# Patient Record
Sex: MALE | Race: WHITE | NOT HISPANIC OR LATINO | Employment: FULL TIME | URBAN - METROPOLITAN AREA
[De-identification: names, ages, dates, MRNs, and addresses within clinical notes are randomized per-mention and may not be internally consistent; named-entity substitution may affect disease eponyms.]

---

## 2017-01-18 ENCOUNTER — TELEPHONE (OUTPATIENT)
Dept: FAMILY MEDICINE | Facility: CLINIC | Age: 37
End: 2017-01-18

## 2017-02-03 ENCOUNTER — OFFICE VISIT (OUTPATIENT)
Dept: URGENT CARE | Facility: RETAIL CLINIC | Age: 37
End: 2017-02-03
Payer: COMMERCIAL

## 2017-02-03 VITALS
OXYGEN SATURATION: 95 % | RESPIRATION RATE: 12 BRPM | HEART RATE: 100 BPM | BODY MASS INDEX: 36.79 KG/M2 | SYSTOLIC BLOOD PRESSURE: 144 MMHG | WEIGHT: 278.8 LBS | DIASTOLIC BLOOD PRESSURE: 91 MMHG | TEMPERATURE: 97.8 F

## 2017-02-03 DIAGNOSIS — H10.33 ACUTE CONJUNCTIVITIS OF BOTH EYES, UNSPECIFIED ACUTE CONJUNCTIVITIS TYPE: ICD-10-CM

## 2017-02-03 DIAGNOSIS — H57.89 REDNESS OR DISCHARGE OF EYE: Primary | ICD-10-CM

## 2017-02-03 PROCEDURE — 99213 OFFICE O/P EST LOW 20 MIN: CPT | Performed by: NURSE PRACTITIONER

## 2017-02-03 ASSESSMENT — PAIN SCALES - GENERAL: PAINLEVEL: NO PAIN (0)

## 2017-02-03 NOTE — NURSING NOTE
"Chief Complaint   Patient presents with     Eye Problem     left eye. started this morning. discharge, minor itching       Initial /91 mmHg  Pulse 100  Temp(Src) 97.8  F (36.6  C) (Tympanic)  Resp 12  Wt 278 lb 12.8 oz (126.463 kg)  SpO2 95% Estimated body mass index is 36.79 kg/(m^2) as calculated from the following:    Height as of 2/4/16: 6' 1\" (1.854 m).    Weight as of this encounter: 278 lb 12.8 oz (126.463 kg).  BP completed using cuff size: jena Holbrook CMA (AAMA)      "

## 2017-02-03 NOTE — PROGRESS NOTES
SUBJECTIVE:  Howie Yang is a 37 year old male who presents complaining of mild left eye discharge, mattering, redness for 1 day(s).   Onset/timing: sudden.    Associated Signs and Symptoms: weepy & funny feeling in eye (was exposed to Pink Eye), a little blurry, been fine since Thanksgiving   Treatment measures tried include: none  Contact wearer : No    Past Medical History   Diagnosis Date     NO ACTIVE PROBLEMS      Current Outpatient Prescriptions   Medication Sig Dispense Refill     neomycin-polymixin-dexamethasone (MAXITROL) ophthalmic ointment Instil 1/2 inch ribbon of ointment into affected eyes every 4 hours for 7 to 10 days. 3.5 g 0     IBUPROFEN PO Take 600 mg by mouth       History   Smoking status     Former Smoker   Smokeless tobacco     Former User     Types: Snuff       ROS:  Review of systems negative except as stated above.    OBJECTIVE:  /91 mmHg  Pulse 100  Temp(Src) 97.8  F (36.6  C) (Tympanic)  Resp 12  Wt 278 lb 12.8 oz (126.463 kg)  SpO2 95%  General: no acute distress  Eye exam: right eye abnormal findings: conjunctivitis with erythema, left eye abnormal findings: conjunctivitis with erythema, possible scratch.  Ears: normal canals, TMs bilaterally, normal TM mobility  Nose: NORMAL - no drainage, turbinates normal in size.  Neck: supple, non-tender, free range of motion, no adenopathy  Heart: NORMAL - regular rate and rhythm without murmur.  Lungs: normal and clear to auscultation    ASSESSMENT:  Redness or discharge of eye [H57.8]  Acute conjunctivitis of both eyes, unspecified acute conjunctivitis type [H10.33]    PLAN:  neomycin-polymixin-dexamethasone (MAXITROL) ophthalmic ointment  Before administering antibiotic, remove all the pus from the eye with warm water & a wipe.  The yellowish discharge should clear up in 72 hours. The red eyes may continue for several more days.  Patient is instructed on hygiene for conjunctivitis: discard her own kleenex, avoid rubbing  unaffected eye(rubbing eyes can make the infection last longer), wash hands frequently, change linens which become contaminated.  Touching eyes also puts a lot of germs on hands & can spread the infection.  After using eye drops for 24 hours, and if the pus is minimal, children can return to day care or school as they should no longer be Contagious.  If treated with an oral antibiotic the wait time to return to  is 48 hours.  Be seen by PCP or even go to the ED if outer eyelids become very red or swollen, eye becomes painful or vision becomes blurred.    F/U with PCP if infection isn't cleared up after 3 days of treatment or an earache develops.    Alin WILBURN, MSN, Family NP-C  [facility]  February 3, 2017

## 2017-02-03 NOTE — MR AVS SNAPSHOT
"              After Visit Summary   2/3/2017    Howie Yang    MRN: [id number]           Patient Information     Date Of Birth          1980        Visit Information        Provider Department      2/3/2017 2:40 PM Alin Gonzales APRN [facility]        Today's Diagnoses     Redness or discharge of eye    -  1     Acute conjunctivitis of both eyes, unspecified acute conjunctivitis type            Follow-ups after your visit        Who to contact     You can reach your care team any time of the day by calling [phone].  Notification of test results:  If you have an abnormal lab result, we will notify you by phone as soon as possible.         Additional Information About Your Visit        MyChart Information     [employer] lets you send messages to your doctor, view your test results, renew your prescriptions, schedule appointments and more. To sign up, go to [email or web address] . Click on \"Log in\" on the left side of the screen, which will take you to the Welcome page. Then click on \"Sign up Now\" on the right side of the page.     You will be asked to enter the access code listed below, as well as some personal information. Please follow the directions to create your username and password.     Your access code is: [email or web address]  Expires: 2017  2:55 PM     Your access code will  in 90 days. If you need help or a new code, please call your [facility] clinic or [phone].        Care EveryWhere ID     This is your [facility] EveryWhere ID. This could be used by other organizations to access your [facility] medical records  [id number]        Your Vitals Were     Pulse Temperature Respirations Pulse Oximetry          100 97.8  F (36.6  C) (Tympanic) 12 95%         Blood Pressure from Last 3 Encounters:   17 144/91   16 126/78   01/10/16 148/92    Weight from Last 3 Encounters:   17 278 lb 12.8 oz (126.463 kg)   16 265 lb (120.203 kg)   01/10/16 250 lb " (113.399 kg)              Today, you had the following     No orders found for display         Today's Medication Changes          These changes are accurate as of: 2/3/17  2:55 PM.  If you have any questions, ask your nurse or doctor.               Start taking these medicines.        Dose/Directions    neomycin-polymixin-dexamethasone ophthalmic ointment   Commonly known as:  MAXITROL   Used for:  Acute conjunctivitis of both eyes, unspecified acute conjunctivitis type, Redness or discharge of eye   Started by:  Alin Gonzales, AKILA CNP        Instil 1/2 inch ribbon of ointment into affected eyes every 4 hours for 7 to 10 days.   Quantity:  3.5 g   Refills:  0            Where to get your medicines      These medications were sent to [facility]  [address] [facility] [address]     Phone:  [phone]    - neomycin-polymixin-dexamethasone ophthalmic ointment             Primary Care Provider Office Phone #    [facility] [phone]       No address on file        Thank you!     Thank you for choosing [facility]  for your care. Our goal is always to provide you with excellent care. Hearing back from our patients is one way we can continue to improve our services. Please take a few minutes to complete the written survey that you may receive in the mail after your visit with us. Thank you!             Your Updated Medication List - Protect others around you: Learn how to safely use, store and throw away your medicines at www.disposemymeds.org.          This list is accurate as of: 2/3/17  2:55 PM.  Always use your most recent med list.                   Brand Name Dispense Instructions for use    IBUPROFEN PO      Take 600 mg by mouth       neomycin-polymixin-dexamethasone ophthalmic ointment    MAXITROL    3.5 g    Instil 1/2 inch ribbon of ointment into affected eyes every 4 hours for 7 to 10 days.

## 2017-06-09 PROCEDURE — 89321 SEMEN ANAL SPERM DETECTION: CPT | Performed by: FAMILY MEDICINE

## 2017-06-10 DIAGNOSIS — Z30.2 ENCOUNTER FOR VASECTOMY: ICD-10-CM

## 2017-06-10 LAB — SPERM P VAS SMN QL MICRO: NORMAL

## 2017-06-11 NOTE — PROGRESS NOTES
Please notify patient of lab result signifying that his vasectomy was successful.    Medhat Herrera MD

## 2017-09-13 ENCOUNTER — NURSE TRIAGE (OUTPATIENT)
Dept: NURSING | Facility: CLINIC | Age: 37
End: 2017-09-13

## 2017-09-13 ENCOUNTER — TELEPHONE (OUTPATIENT)
Dept: FAMILY MEDICINE | Facility: CLINIC | Age: 37
End: 2017-09-13

## 2017-09-13 ENCOUNTER — HOSPITAL ENCOUNTER (EMERGENCY)
Facility: CLINIC | Age: 37
Discharge: HOME | End: 2017-09-13
Attending: FAMILY MEDICINE | Admitting: FAMILY MEDICINE
Payer: COMMERCIAL

## 2017-09-13 VITALS
HEIGHT: 73 IN | BODY MASS INDEX: 36.45 KG/M2 | DIASTOLIC BLOOD PRESSURE: 106 MMHG | OXYGEN SATURATION: 97 % | WEIGHT: 275 LBS | RESPIRATION RATE: 20 BRPM | SYSTOLIC BLOOD PRESSURE: 143 MMHG | TEMPERATURE: 98.8 F | HEART RATE: 96 BPM

## 2017-09-13 DIAGNOSIS — R03.0 ELEVATED BLOOD PRESSURE READING WITHOUT DIAGNOSIS OF HYPERTENSION: ICD-10-CM

## 2017-09-13 DIAGNOSIS — K62.5 RECTAL BLEEDING: ICD-10-CM

## 2017-09-13 LAB — HEMOCCULT STL QL: NEGATIVE

## 2017-09-13 PROCEDURE — 99284 EMERGENCY DEPT VISIT MOD MDM: CPT | Mod: Z6 | Performed by: FAMILY MEDICINE

## 2017-09-13 PROCEDURE — 99283 EMERGENCY DEPT VISIT LOW MDM: CPT | Performed by: FAMILY MEDICINE

## 2017-09-13 PROCEDURE — 82272 OCCULT BLD FECES 1-3 TESTS: CPT | Performed by: FAMILY MEDICINE

## 2017-09-13 NOTE — ED AVS SNAPSHOT
[facility] Emergency Department    [address] [place] [address]    Phone:  [phone]    Fax:  [phone]                                       Howie Yang   MRN: [id number]    Department:  [facility] Emergency Department   Date of Visit:  9/13/2017           Patient Information     Date Of Birth          1980        Your diagnoses for this visit were:     Rectal bleeding     Elevated blood pressure reading without diagnosis of hypertension        You were seen by Jarett Garay MD.      Follow-up Information     Follow up with Primary clinic provider In 5 days.        Discharge Instructions       Thank you for giving us the opportunity to see you.  There was no sign of rectal bleeding today.    Your blood pressure has been elevated during your ER visit.    Please obtain a few more blood pressures over the next week, and follow-up with a primary care provider in the clinic in 5-7 days.    You may need to be scheduled for a sigmoidoscopy or colonoscopy.    After discharge, please closely monitor for any new or worsening symptoms. Return to the Emergency Department at any time if your symptoms worsen.        Understanding Rectal Bleeding    Rectal bleeding is when blood passes through your rectum and anus. It can happen with or without a bowel movement. Rectal bleeding may be a sign of a serious problem in your rectum, colon, or upper GI tract. Call your healthcare provider right away if you have any rectal bleeding.  The GI Tract  The gastrointestinal (GI) tract includes the mouth, esophagus, stomach, small intestine, large intestine (colon), rectum, and anus. The food you eat is digested as it passes through the GI tract. Solid waste leaves the body through the rectum.   Rectal bleeding and GI problems  The cause of rectal bleeding may be found in any region of the GI tract. The colon or rectum may be the site of your bleeding problem. Or, bleeding may be due to  problems farther up the GI tract, such as in the small intestine, duodenum, or stomach.  Causes of rectal bleeding  Rectal bleeding causes include the following:    Hemorrhoids (swollen veins in the rectum and anus)    Fissures (tears in or near the anus)    Diverticulosis (inflamed pockets in the colon wall)    Infection    Ischemia (low blood flow)    Radiation damage    Inflammatory bowel disease (Crohn's disease or ulcerative colitis)    Ulcers in the upper GI tract and inflammation of the large intestine    Abnormal tissue growths (tumors or polyps) in the GI tract    A bulging rectum (also called a rectal prolapse)    Abnormal blood vessels in the small intestine or in the colon  Common symptoms  Common symptoms include the following:    Rectal pain, itching, or soreness    Belly pain or epigastric pain    Minor occasional drops of blood that appear on the stool or toilet paper, to greater amounts of stool that appear black or tarry   Rectal bleeding can also happen without pain.  Date Last Reviewed: 7/1/2016 2000-2017 [employer]. [address] [place] [address]. All rights reserved. This information is not intended as a substitute for professional medical care. Always follow your healthcare professional's instructions.          Discharge References/Attachments     HYPERTENSION, TO BE CONFIRMED (ENGLISH)      24 Hour Appointment Hotline       To make an appointment at any [facility] clinic, call [phone] ([phone]). If you don't have a family doctor or clinic, we will help you find one. [facility] clinics are conveniently located to serve the needs of you and your family.             Review of your medicines      Our records show that you are taking the medicines listed below. If these are incorrect, please call your family doctor or clinic.        Dose / Directions Last dose taken    IBUPROFEN PO   Dose:  600 mg        Take 600 mg by mouth   Refills:  0               "  Procedures and tests performed during your visit     Occult blood stool      Orders Needing Specimen Collection     None      Pending Results     No orders found from 2017 to 2017.            Pending Culture Results     No orders found from 2017 to 2017.            Pending Results Instructions     If you had any lab results that were not finalized at the time of your Discharge, you can call the ED Lab Result RN at [phone]. You will be contacted by this team for any positive Lab results or changes in treatment. The nurses are available 7 days a week from 10A to 6:30P.  You can leave a message 24 hours per day and they will return your call.        Thank you for choosing [facility]       Thank you for choosing [facility] for your care. Our goal is always to provide you with excellent care. Hearing back from our patients is one way we can continue to improve our services. Please take a few minutes to complete the written survey that you may receive in the mail after you visit with us. Thank you!        [employer] Information     [facility] [employer] lets you send messages to your doctor, view your test results, renew your prescriptions, schedule appointments and more. To sign up, go to [email or web address] . Click on \"Log in\" on the left side of the screen, which will take you to the Welcome page. Then click on \"Sign up Now\" on the right side of the page.     You will be asked to enter the access code listed below, as well as some personal information. Please follow the directions to create your username and password.     Your access code is: [email or web address]  Expires: 2017  8:31 PM     Your access code will  in 90 days. If you need help or a new code, please call your [facility] clinic or [phone].        Care EveryWhere ID     This is your Care EveryWhere ID. This could be used by other organizations to access your [facility] medical records  [id number]        Equal Access to Services     [facility] " ALEXIA : Amiii jose guadalupe Henderson, wagraceda luqadaha, qaybta kaalsanegeta zambrano, rishi koehler. So [facility] [phone].    ATENCIÓN: Si habla español, tiene a britton disposición servicios gratuitos de asistencia lingüística. Llame al [phone].    We comply with applicable federal civil rights laws and [place] laws. We do not discriminate on the basis of race, color, national origin, age, disability sex, sexual orientation or gender identity.            After Visit Summary       This is your record. Keep this with you and show to your community pharmacist(s) and doctor(s) at your next visit.

## 2017-09-13 NOTE — ED AVS SNAPSHOT
[facility] Emergency Department    [address] [place] [address]    Phone:  [phone]    Fax:  [phone]                                       Howie Yang   MRN: [id number]    Department:  [facility] Emergency Department   Date of Visit:  9/13/2017           After Visit Summary Signature Page     I have received my discharge instructions, and my questions have been answered. I have discussed any challenges I see with this plan with the nurse or doctor.    ..........................................................................................................................................  Patient/Patient Representative Signature      ..........................................................................................................................................  Patient Representative Print Name and Relationship to Patient    ..................................................               ................................................  Date                                            Time    ..........................................................................................................................................  Reviewed by Signature/Title    ...................................................              ..............................................  Date                                                            Time

## 2017-09-14 NOTE — TELEPHONE ENCOUNTER
Left message at [facility] to call back. We will fit him in.   Michelle Velarde [facility]

## 2017-09-14 NOTE — DISCHARGE INSTRUCTIONS
Thank you for giving us the opportunity to see you.  There was no sign of rectal bleeding today.    Your blood pressure has been elevated during your ER visit.    Please obtain a few more blood pressures over the next week, and follow-up with a primary care provider in the clinic in 5-7 days.    You may need to be scheduled for a sigmoidoscopy or colonoscopy.    After discharge, please closely monitor for any new or worsening symptoms. Return to the Emergency Department at any time if your symptoms worsen.        Understanding Rectal Bleeding    Rectal bleeding is when blood passes through your rectum and anus. It can happen with or without a bowel movement. Rectal bleeding may be a sign of a serious problem in your rectum, colon, or upper GI tract. Call your healthcare provider right away if you have any rectal bleeding.  The GI Tract  The gastrointestinal (GI) tract includes the mouth, esophagus, stomach, small intestine, large intestine (colon), rectum, and anus. The food you eat is digested as it passes through the GI tract. Solid waste leaves the body through the rectum.   Rectal bleeding and GI problems  The cause of rectal bleeding may be found in any region of the GI tract. The colon or rectum may be the site of your bleeding problem. Or, bleeding may be due to problems farther up the GI tract, such as in the small intestine, duodenum, or stomach.  Causes of rectal bleeding  Rectal bleeding causes include the following:    Hemorrhoids (swollen veins in the rectum and anus)    Fissures (tears in or near the anus)    Diverticulosis (inflamed pockets in the colon wall)    Infection    Ischemia (low blood flow)    Radiation damage    Inflammatory bowel disease (Crohn's disease or ulcerative colitis)    Ulcers in the upper GI tract and inflammation of the large intestine    Abnormal tissue growths (tumors or polyps) in the GI tract    A bulging rectum (also called a rectal prolapse)    Abnormal blood vessels in  the small intestine or in the colon  Common symptoms  Common symptoms include the following:    Rectal pain, itching, or soreness    Belly pain or epigastric pain    Minor occasional drops of blood that appear on the stool or toilet paper, to greater amounts of stool that appear black or tarry   Rectal bleeding can also happen without pain.  Date Last Reviewed: 7/1/2016 2000-2017 The "[employer]. [address] [place] [address], [place] [address]. All rights reserved. This information is not intended as a substitute for professional medical care. Always follow your healthcare professional's instructions.

## 2017-09-14 NOTE — TELEPHONE ENCOUNTER
"  Reason for Disposition    [1] MODERATE rectal bleeding (small blood clots, passing blood without stool, or toilet water turns red) AND [2] more than once a day    Additional Information    Negative: Shock suspected (e.g., cold/pale/clammy skin, too weak to stand, low BP, rapid pulse)    Negative: Difficult to awaken or acting confused  (e.g., disoriented, slurred speech)    Negative: Passed out (i.e., lost consciousness, collapsed and was not responding)    Negative: [1] Vomiting AND [2] contains red blood or black (\"coffee ground\") material  (Exception: few red streaks in vomit that only happened once)    Negative: Sounds like a life-threatening emergency to the triager    Negative: Diarrhea is main symptom    Negative: Stool color other than brown or tan is main concern  (no bleeding and no melena)    Negative: SEVERE rectal bleeding (large blood clots; on and off, or constant bleeding)    Negative: SEVERE dizziness (e.g., unable to stand, requires support to walk, feels like passing out now)    Protocols used: RECTAL BLEEDING-ADULT-ROGER Denise calls and says that he has had some rectal bleeding and blood in his stools, for a few months, on & off. Pt. Says that when he eats hot and spicy sauces or foods, he has rectal bleeding.  "

## 2017-09-14 NOTE — ED NOTES
Rectal bleeding on and off for several days, called nurse line and they told him to come in and get it checked out

## 2017-09-14 NOTE — ED PROVIDER NOTES
[facility] ED Provider Note   CC:     Chief Complaint   Patient presents with     Rectal Bleeding     HPI:  Howie Yang is a 37 year old male who presented to the emergency department with episodic rectal bleeding for the past several months.  Patient states that it would happen randomly, but typically when he ate some spicy chicken wing sauce or spicy foods.  He does not have any consistent abdominal pain, but will get some discomfort when he eats spicy foods.  He has not had any rectal pain, pressure, and when he does have some rectal bleeding, it does not seem to be mixed in with the stool.  He has some bright red blood in the toilet bowl and when he wipes.  He has had infrequent episodes of hemorrhoids, but does not have any current rectal pain.  He had an episode last night, but it was normal today.  He read on the Internet that this could be a problem, called for an appointment, and was told to come in [facility].  Patient has no other significant past medical history.  He has had a vasectomy.  He's not on any current medications and denies any tobacco, alcohol or drug use.  He works in a car shop doing  work.  He used to be more fit, but is trying to get back into more regular exercise and losing some weight.  He recently went fishing in the [employer] and walked 12 miles a day.  He did not have any chest pain, shortness of breath, nausea, abdominal pain.  There is no significant family history of colon problems.    Problem List:  Patient Active Problem List    Diagnosis     CARDIOVASCULAR SCREENING; LDL GOAL LESS THAN 160       MEDS:   Previous Medications    IBUPROFEN PO    Take 600 mg by mouth       ALLERGIES:    Allergies   Allergen Reactions     No Known Drug Allergies        Past medical, surgical, family and social histories, triage and nursing notes were all reviewed.    Review of Systems   All other systems were  "reviewed and are negative    Physical Exam     Vitals were reviewed  Patient Vitals for the past 8 hrs:   BP Temp Temp src Heart Rate Resp SpO2 Height Weight   09/13/17 1951 (!) 157/123 98.8  F (37.1  C) Temporal 102 20 97 % 1.854 m (6' 1\") 124.7 kg (275 lb)     GENERAL APPEARANCE: Alert, no distress  FACE: normal facies  EYES: Pupils are equal  HENT: normal external exam  NECK: no adenopathy or asymmetry  RESP: normal respiratory effort; clear breath sounds bilaterally  CV: regular rate and rhythm; no significant murmurs, gallops or rubs  ABD: soft, no tenderness; no rebound or guarding; bowel sounds are normal  RECTAL: Normal rectal tone, normal size prostate, brownish colored stool.  No evidence for active bleeding fissure or hemorrhoids.  EXT: No calf tenderness or pitting edema  SKIN: no worrisome rash  NEURO: no facial droop; no focal deficits, speech is normal        Available Lab/Imaging Results     Results for orders placed or performed during the hospital encounter of 09/13/17 (from the past 24 hour(s))   Occult blood stool   Result Value Ref Range    Occult Blood Negative NEG^Negative         Impression     Final diagnoses:   Rectal bleeding   Elevated blood pressure reading without diagnosis of hypertension       ED Course & Medical Decision Making   Howie Yang is a 37 year old male who presented to the emergency department with episodic rectal bleeding that occurs perhaps once a week for the last several months.  He does not have any significant heavy bleeding, but notices some blood in the toilet and when he wipes.  Patient was trying to get into the clinic, was told to come to the ER.  He does not have any symptoms such as dizziness, and did not have any rectal bleeding today.  He has associated rectal bleeding with eating certain spicy foods or sauces.  Patient was seen shortly after arrival.  Blood pressure was elevated 157/123, and upon repeat, blood pressures were still elevated.  Patient's " exam was unremarkable, and a rectal exam revealed normal light brown stool.  Hemoccult is negative.  Patient will follow-up in the clinic in the next 5-7 days, and have the blood pressure repeated.  Patient wanted to set up an appointment with a primary care provider, and possibly pursue a sigmoidoscopy or colonoscopy.  Patient is medically stable for discharge home.      Written after-visit summary and instructions were given at the time of discharge.      New Prescriptions    No medications on file         This note was completed in part using Dragon voice recognition, and may contain word and grammatical errors.        Jarett Garay MD  09/13/17 6635

## 2017-09-14 NOTE — TELEPHONE ENCOUNTER
Patient needs to schedule an ED Follow up  Appointment for Rectal bleeding per Dr. Garay between 9/18/2017 - 9/20/2017

## 2017-09-21 ENCOUNTER — OFFICE VISIT (OUTPATIENT)
Dept: FAMILY MEDICINE | Facility: CLINIC | Age: 37
End: 2017-09-21
Payer: COMMERCIAL

## 2017-09-21 VITALS
HEART RATE: 112 BPM | SYSTOLIC BLOOD PRESSURE: 150 MMHG | OXYGEN SATURATION: 98 % | TEMPERATURE: 97.7 F | WEIGHT: 257.3 LBS | BODY MASS INDEX: 33.95 KG/M2 | DIASTOLIC BLOOD PRESSURE: 102 MMHG

## 2017-09-21 DIAGNOSIS — I10 BENIGN ESSENTIAL HYPERTENSION: Primary | ICD-10-CM

## 2017-09-21 DIAGNOSIS — K62.5 RECTAL BLEEDING: ICD-10-CM

## 2017-09-21 PROCEDURE — 99214 OFFICE O/P EST MOD 30 MIN: CPT | Performed by: FAMILY MEDICINE

## 2017-09-21 RX ORDER — CHLORTHALIDONE 25 MG/1
12.5 TABLET ORAL DAILY
Qty: 45 TABLET | Refills: 1 | Status: SHIPPED | OUTPATIENT
Start: 2017-09-21 | End: 2018-03-05

## 2017-09-21 ASSESSMENT — PAIN SCALES - GENERAL: PAINLEVEL: MILD PAIN (3)

## 2017-09-21 NOTE — PROGRESS NOTES
SUBJECTIVE:                                                    Howie Yang is a 37 year old male who presents to clinic today for the following health issues:    Chief Complaint   Patient presents with     ER F/U        ED/UC Followup:    Facility:  [facility]  Date of visit: 09/13/2017  Reason for visit: Rectal bleeding  Current Status: states he hasn't had any bleeding since.      Pleasant 37-year-old who is a  comes in with several months of intermittent rectal bleeding. He has bright red bleeding per rectum on his stools. He was seen in the ED and had negative fecal occult blood test at that time. Denies any abdominal pain. No fevers or chills. He's had some intentional weight loss. No family history of colorectal cancer. He was seen and found to have another blood pressures well.    BP Readings from Last 3 Encounters:   09/21/17 (!) 150/102   09/13/17 (!) 143/106   02/03/17 (!) 144/91           ROS:  Constitutional, HEENT, cardiovascular, pulmonary, gi and gu systems are negative, except as otherwise noted.      OBJECTIVE:                                                    BP (!) 150/102 (BP Location: Left arm, Patient Position: Chair, Cuff Size: Adult Regular)  Pulse 112  Temp 97.7  F (36.5  C) (Temporal)  Wt 257 lb 4.8 oz (116.7 kg)  SpO2 98%  BMI 33.95 kg/m2  Body mass index is 33.95 kg/(m^2).    GENERAL: healthy, alert and no distress  NECK: no adenopathy, no asymmetry, masses, or scars and thyroid normal to palpation  RESP: lungs clear to auscultation - no rales, rhonchi or wheezes  CV: regular rate and rhythm, normal S1 S2, no S3 or S4, no murmur, click or rub, no peripheral edema and peripheral pulses strong  ABDOMEN: soft, nontender, no hepatosplenomegaly, no masses and bowel sounds normal  MS: no gross musculoskeletal defects noted, no edema    Diagnostic Test Results:  none      ASSESSMENT/PLAN:                                                        ICD-10-CM    1. Benign essential  hypertension I10 chlorthalidone (HYGROTON) 25 MG tablet   2. Rectal bleeding K62.5 GASTROENTEROLOGY ADULT REF PROCEDURE ONLY       Set him up for colonoscopy given his history of bleeding. He has stage II hypertension. Strongly encouraged to lose weight and other lifestyle modifications, he was also started on chlorthalidone 12.5 daily. See him back in one month for recheck. Begin home monitoring.    Tian Badillo MD  [facility]

## 2017-09-21 NOTE — MR AVS SNAPSHOT
After Visit Summary   9/21/2017    Howie Yang    MRN: [id number]           Patient Information     Date Of Birth          1980        Visit Information        Provider Department      9/21/2017 12:20 PM Tian Badillo MD [facility]        Today's Diagnoses     Benign essential hypertension    -  1    Rectal bleeding           Follow-ups after your visit        Additional Services     GASTROENTEROLOGY ADULT REF PROCEDURE ONLY       Last Lab Result: No results found for: CR  Body mass index is 33.95 kg/(m^2).      Patient will be contacted to schedule procedure.     Please be aware that coverage of these services is subject to the terms and limitations of your health insurance plan.  Call member services at your health plan with any benefit or coverage questions.  Any procedures must be performed at a [place] facility OR coordinated by your clinic's referral office.    Please bring the following with you to your appointment:    (1) Any X-Rays, CTs or MRIs which have been performed.  Contact the facility where they were done to arrange for  prior to your scheduled appointment.    (2) List of current medications   (3) This referral request   (4) Any documents/labs given to you for this referral                  Your next 10 appointments already scheduled     Oct 18, 2017  1:20 PM CDT   SHORT with Tian Badillo MD   [facility] ([facility]    [address] [place] [id number] [phone]              Who to contact     If you have questions or need follow up information about today's clinic visit or your schedule please contact [facility] directly at [phone].  Normal or non-critical lab and imaging results will be communicated to you by MyChart, letter or phone within 4 business days after the clinic has received the results. If you do not hear from us within 7 days, please contact the clinic  "through [employer] or phone. If you have a critical or abnormal lab result, we will notify you by phone as soon as possible.  Submit refill requests through [employer] or call your pharmacy and they will forward the refill request to us. Please allow 3 business days for your refill to be completed.          Additional Information About Your Visit        [employer] Information     [employer] lets you send messages to your doctor, view your test results, renew your prescriptions, schedule appointments and more. To sign up, go to [email or web address] . Click on \"Log in\" on the left side of the screen, which will take you to the Welcome page. Then click on \"Sign up Now\" on the right side of the page.     You will be asked to enter the access code listed below, as well as some personal information. Please follow the directions to create your username and password.     Your access code is: [email or web address]  Expires: 2017  8:31 PM     Your access code will  in 90 days. If you need help or a new code, please call your [place] clinic or [phone].        Care EveryWhere ID     This is your Care EveryWhere ID. This could be used by other organizations to access your [place] medical records  [id number]        Your Vitals Were     Pulse Temperature Pulse Oximetry BMI (Body Mass Index)          112 97.7  F (36.5  C) (Temporal) 98% 33.95 kg/m2         Blood Pressure from Last 3 Encounters:   17 (!) 150/102   17 (!) 143/106   17 (!) 144/91    Weight from Last 3 Encounters:   17 257 lb 4.8 oz (116.7 kg)   17 275 lb (124.7 kg)   17 278 lb 12.8 oz (126.5 kg)              We Performed the Following     GASTROENTEROLOGY ADULT REF PROCEDURE ONLY          Today's Medication Changes          These changes are accurate as of: 17 11:59 PM.  If you have any questions, ask your nurse or doctor.               Start taking these medicines.        Dose/Directions    chlorthalidone 25 MG tablet "   Commonly known as:  HYGROTON   Used for:  Benign essential hypertension   Started by:  Tian Badillo MD        Dose:  12.5 mg   Take 0.5 tablets (12.5 mg) by mouth daily   Quantity:  45 tablet   Refills:  1            Where to get your medicines      These medications were sent to [facility], [place] - [address]   [address] , [facility] [address]     Phone:  [phone]     chlorthalidone 25 MG tablet                Primary Care Provider Office Phone #    [facility] [place] [phone]       No address on file        Equal Access to Services     [facility]: Hadii jose guadalupe stovall hadasho Soomaali, waaxda luqadaha, qaybta kaalmada adeegyatimothy, rishi garcia . So [facility] [phone].    ATENCIÓN: Si habla español, tiene a britton disposición servicios gratuitos de asistencia lingüística. [facility] [phone].    We comply with applicable federal civil rights laws and [place] laws. We do not discriminate on the basis of race, color, national origin, age, disability sex, sexual orientation or gender identity.            Thank you!     Thank you for choosing [facility]  for your care. Our goal is always to provide you with excellent care. Hearing back from our patients is one way we can continue to improve our services. Please take a few minutes to complete the written survey that you may receive in the mail after your visit with us. Thank you!             Your Updated Medication List - Protect others around you: Learn how to safely use, store and throw away your medicines at www.disposemymeds.org.          This list is accurate as of: 9/21/17 11:59 PM.  Always use your most recent med list.                   Brand Name Dispense Instructions for use Diagnosis    chlorthalidone 25 MG tablet    HYGROTON    45 tablet    Take 0.5 tablets (12.5 mg) by mouth daily    Benign essential hypertension       IBUPROFEN PO      Take 600 mg by  mouth

## 2017-09-21 NOTE — NURSING NOTE
"Chief Complaint   Patient presents with     ER F/U       Initial BP (!) 150/102 (BP Location: Left arm, Patient Position: Chair, Cuff Size: Adult Regular)  Pulse 112  Temp 97.7  F (36.5  C) (Temporal)  Wt 257 lb 4.8 oz (116.7 kg)  SpO2 98%  BMI 33.95 kg/m2 Estimated body mass index is 33.95 kg/(m^2) as calculated from the following:    Height as of 9/13/17: 6' 1\" (1.854 m).    Weight as of this encounter: 257 lb 4.8 oz (116.7 kg).  Medication Reconciliation: complete   Meredith Kan CMA    Health Maintenance Due   Topic Date Due     INFLUENZA VACCINE (SYSTEM ASSIGNED)  09/01/2017       Health Maintenance reviewed at today's visit patient asked to schedule/complete:   Patient is aware.       "

## 2017-09-22 ENCOUNTER — TELEPHONE (OUTPATIENT)
Dept: FAMILY MEDICINE | Facility: CLINIC | Age: 37
End: 2017-09-22

## 2017-09-22 NOTE — TELEPHONE ENCOUNTER
Howie is scheduled for his Colonoscopy 10/9/17 w/Justino. Arrival 1145am, Procedure 1245pm. Please mail prep instructions.

## 2017-09-22 NOTE — TELEPHONE ENCOUNTER
Left message for patient to return call to schedule colonoscopy or EGD. If Sulema or Jania are unavailable, please transfer to the surgery center.      Schedule with Justino

## 2017-10-08 ENCOUNTER — ANESTHESIA EVENT (OUTPATIENT)
Dept: GASTROENTEROLOGY | Facility: CLINIC | Age: 37
End: 2017-10-08
Payer: COMMERCIAL

## 2017-10-08 ASSESSMENT — LIFESTYLE VARIABLES: TOBACCO_USE: 1

## 2017-10-09 ENCOUNTER — SURGERY (OUTPATIENT)
Age: 37
End: 2017-10-09

## 2017-10-09 ENCOUNTER — HOSPITAL ENCOUNTER (OUTPATIENT)
Facility: CLINIC | Age: 37
Discharge: HOME | End: 2017-10-09
Attending: FAMILY MEDICINE | Admitting: FAMILY MEDICINE
Payer: COMMERCIAL

## 2017-10-09 ENCOUNTER — ANESTHESIA (OUTPATIENT)
Dept: GASTROENTEROLOGY | Facility: CLINIC | Age: 37
End: 2017-10-09
Payer: COMMERCIAL

## 2017-10-09 VITALS
HEART RATE: 85 BPM | TEMPERATURE: 98.1 F | SYSTOLIC BLOOD PRESSURE: 122 MMHG | DIASTOLIC BLOOD PRESSURE: 91 MMHG | RESPIRATION RATE: 16 BRPM

## 2017-10-09 LAB — COLONOSCOPY: NORMAL

## 2017-10-09 PROCEDURE — 25000125 ZZHC RX 250: Performed by: NURSE ANESTHETIST, CERTIFIED REGISTERED

## 2017-10-09 PROCEDURE — 88305 TISSUE EXAM BY PATHOLOGIST: CPT | Performed by: FAMILY MEDICINE

## 2017-10-09 PROCEDURE — 45380 COLONOSCOPY AND BIOPSY: CPT | Mod: PT | Performed by: FAMILY MEDICINE

## 2017-10-09 PROCEDURE — 45380 COLONOSCOPY AND BIOPSY: CPT | Performed by: FAMILY MEDICINE

## 2017-10-09 PROCEDURE — 25000128 H RX IP 250 OP 636: Performed by: NURSE ANESTHETIST, CERTIFIED REGISTERED

## 2017-10-09 PROCEDURE — 40000296 ZZH STATISTIC ENDO RECOVERY CLASS 1:2 FIRST HOUR: Performed by: FAMILY MEDICINE

## 2017-10-09 PROCEDURE — 45385 COLONOSCOPY W/LESION REMOVAL: CPT | Performed by: FAMILY MEDICINE

## 2017-10-09 PROCEDURE — 88305 TISSUE EXAM BY PATHOLOGIST: CPT | Mod: 26 | Performed by: FAMILY MEDICINE

## 2017-10-09 RX ORDER — ONDANSETRON 2 MG/ML
4 INJECTION INTRAMUSCULAR; INTRAVENOUS
Status: DISCONTINUED | OUTPATIENT
Start: 2017-10-09 | End: 2017-10-09 | Stop reason: HOSPADM

## 2017-10-09 RX ORDER — LIDOCAINE 40 MG/G
CREAM TOPICAL
Status: DISCONTINUED | OUTPATIENT
Start: 2017-10-09 | End: 2017-10-09 | Stop reason: HOSPADM

## 2017-10-09 RX ORDER — SODIUM CHLORIDE, SODIUM LACTATE, POTASSIUM CHLORIDE, CALCIUM CHLORIDE 600; 310; 30; 20 MG/100ML; MG/100ML; MG/100ML; MG/100ML
INJECTION, SOLUTION INTRAVENOUS CONTINUOUS PRN
Status: DISCONTINUED | OUTPATIENT
Start: 2017-10-09 | End: 2017-10-09

## 2017-10-09 RX ORDER — NALOXONE HYDROCHLORIDE 0.4 MG/ML
.1-.4 INJECTION, SOLUTION INTRAMUSCULAR; INTRAVENOUS; SUBCUTANEOUS
Status: DISCONTINUED | OUTPATIENT
Start: 2017-10-09 | End: 2017-10-09 | Stop reason: HOSPADM

## 2017-10-09 RX ORDER — PROPOFOL 10 MG/ML
INJECTION, EMULSION INTRAVENOUS PRN
Status: DISCONTINUED | OUTPATIENT
Start: 2017-10-09 | End: 2017-10-09

## 2017-10-09 RX ORDER — PROPOFOL 10 MG/ML
INJECTION, EMULSION INTRAVENOUS CONTINUOUS PRN
Status: DISCONTINUED | OUTPATIENT
Start: 2017-10-09 | End: 2017-10-09

## 2017-10-09 RX ORDER — LIDOCAINE HYDROCHLORIDE 20 MG/ML
INJECTION, SOLUTION INFILTRATION; PERINEURAL PRN
Status: DISCONTINUED | OUTPATIENT
Start: 2017-10-09 | End: 2017-10-09

## 2017-10-09 RX ORDER — ONDANSETRON 4 MG/1
4 TABLET, ORALLY DISINTEGRATING ORAL EVERY 30 MIN PRN
Status: DISCONTINUED | OUTPATIENT
Start: 2017-10-09 | End: 2017-10-09 | Stop reason: HOSPADM

## 2017-10-09 RX ORDER — ONDANSETRON 2 MG/ML
4 INJECTION INTRAMUSCULAR; INTRAVENOUS EVERY 30 MIN PRN
Status: DISCONTINUED | OUTPATIENT
Start: 2017-10-09 | End: 2017-10-09 | Stop reason: HOSPADM

## 2017-10-09 RX ORDER — SODIUM CHLORIDE, SODIUM LACTATE, POTASSIUM CHLORIDE, CALCIUM CHLORIDE 600; 310; 30; 20 MG/100ML; MG/100ML; MG/100ML; MG/100ML
INJECTION, SOLUTION INTRAVENOUS CONTINUOUS
Status: DISCONTINUED | OUTPATIENT
Start: 2017-10-09 | End: 2017-10-09 | Stop reason: HOSPADM

## 2017-10-09 RX ADMIN — LIDOCAINE HYDROCHLORIDE 40 MG: 20 INJECTION, SOLUTION INFILTRATION; PERINEURAL at 12:49

## 2017-10-09 RX ADMIN — SODIUM CHLORIDE, POTASSIUM CHLORIDE, SODIUM LACTATE AND CALCIUM CHLORIDE: 600; 310; 30; 20 INJECTION, SOLUTION INTRAVENOUS at 12:20

## 2017-10-09 RX ADMIN — PROPOFOL 150 MCG/KG/MIN: 10 INJECTION, EMULSION INTRAVENOUS at 12:49

## 2017-10-09 RX ADMIN — PROPOFOL 100 MG: 10 INJECTION, EMULSION INTRAVENOUS at 12:49

## 2017-10-09 NOTE — H&P
Pre-Endoscopy History and Physical     Howie Yang MRN# [id number]   YOB: 1980 Age: 37 year old     Date of Procedure: 10/9/2017  Primary care provider: Norbert Daly  Type of Endoscopy: colonoscopy  Reason for Procedure: rectal bleeding  Type of Anesthesia Anticipated: MAC    HPI:    Howie is a 37 year old male who will be undergoing the above procedure.      Howie is feeling well today. Can get up a single flight of stairs without dyspnea. Estimated METS > 4. The risks and benefits of the procedure and the sedation options and risks were discussed with the patient. These include infection, bleeding, and small risk of colon perforation (1/1000 to 1/23201 depending on patient characteristics and type of procedure). Howie was also explained to alternatives for colo-rectal screening. All questions were answered and informed consent was obtained.      Problem list, medications, surgical history, reviewed    REVIEW OF SYSTEMS:     5 point ROS negative except as noted above in HPI, including Gen., Resp., CV, GI &  system review.      PHYSICAL EXAM:   BP (!) 122/91  Pulse 85  Temp 98.1  F (36.7  C) (Oral)  Resp 16   GENERAL APPEARANCE: alert and no distress  RESP: lungs clear to auscultation  CV: regular  ABD: soft, nt/nd    DIAGNOSTICS:    Not indicated      IMPRESSION   ASA Class 2 - Mild systemic disease        PLAN:     Plan for colonoscopy. No medical contraindications to proceed, or further work up needed. We discussed the risks, benefits and alternatives and the patient wished to proceed.    The above has been forwarded to the consulting provider.      Signed Electronically by: Tian Badillo  October 9, 2017

## 2017-10-09 NOTE — DISCHARGE INSTRUCTIONS
[facility]    Home Care Following Endoscopy          Activity:    You have just undergone an endoscopic procedure usually performed with conscious sedation.  Do not work or operate machinery (including a car) for at least 12 hours.      I encourage you to walk and attempt to pass this air as soon as possible.    Diet:    Return to the diet you were on before your procedure but eat lightly for the first 12-24 hours.    Drink plenty of water.    Resume any regular medications unless otherwise advised by your physician.  Please begin any new medication prescribed as a result of your procedure as directed by your physician.     If you had any biopsy or polyp removed please refrain from aspirin or aspirin products for 2 days.  If on Coumadin please restart as instructed by your physician.   Pain:    You may take Tylenol as needed for pain.  Expected Recovery:    You can expect some mild abdominal fullness and/or discomfort due to the air used to inflate your intestinal tract. It is also normal to have a mild sore throat after upper endoscopy.    Call Your Physician if You Have:    After Upper Endoscopy:  o Shoulder, back or chest pain.  o Difficulty breathing or swallowing.  o Vomiting blood.    After Colonoscopy:  o Worsening persisting abdominal pain which is worse with activity.  o Fevers (>101 degrees F), chills or shakes.  o Passage of continued blood with bowel movements.   Any questions or concerns about your recovery, please call [phone] or after hours [phone] Nurse Advice Line.    Follow-up Care:    You should receive a call or letter with your results within 1 week. Please call if you have not received a notification of your results.    If asked to return to clinic please make an appointment 1 week after your procedure.  Call [phone].

## 2017-10-09 NOTE — ANESTHESIA POSTPROCEDURE EVALUATION
Patient: Howie Yang    Procedure(s):  Colonoscopy with polypectomies by snare - Wound Class: II-Clean Contaminated    Diagnosis:rectal bleeding  Diagnosis Additional Information: No value filed.    Anesthesia Type:  MAC    Note:  Anesthesia Post Evaluation    Patient location during evaluation: Phase 2 and Bedside  Patient participation: Able to fully participate in evaluation  Level of consciousness: awake  Pain management: adequate  Airway patency: patent  Cardiovascular status: acceptable and hemodynamically stable  Respiratory status: acceptable, room air and nonlabored ventilation  Hydration status: stable  PONV: none     Anesthetic complications: None    Comments: Patient was happy with the anesthesia care received and no anesthesia related complications were noted.  I will follow up with the patient again if it is needed.        Last vitals:  Vitals:    10/09/17 1159   BP: (!) 146/95   Pulse: 90   Resp: 16   Temp: 98.1  F (36.7  C)         Electronically Signed By: AKILA Roa CRNA  October 9, 2017  1:22 PM

## 2017-10-09 NOTE — IP AVS SNAPSHOT
[facility]    [address] [address]    Phone:  [phone]                                       After Visit Summary   10/9/2017    Howie Yang    MRN: [id number]           After Visit Summary Signature Page     I have received my discharge instructions, and my questions have been answered. I have discussed any challenges I see with this plan with the nurse or doctor.    ..........................................................................................................................................  Patient/Patient Representative Signature      ..........................................................................................................................................  Patient Representative Print Name and Relationship to Patient    ..................................................               ................................................  Date                                            Time    ..........................................................................................................................................  Reviewed by Signature/Title    ...................................................              ..............................................  Date                                                            Time

## 2017-10-09 NOTE — ANESTHESIA PREPROCEDURE EVALUATION
Anesthesia Evaluation     . Pt has not had prior anesthetic            ROS/MED HX    ENT/Pulmonary:     (+)tobacco use, Past use , . .    Neurologic:  - neg neurologic ROS     Cardiovascular:     (+) hypertension----. : . . . :. . No previous cardiac testing       METS/Exercise Tolerance:  >4 METS   Hematologic:  - neg hematologic  ROS       Musculoskeletal:  - neg musculoskeletal ROS       GI/Hepatic:  - neg GI/hepatic ROS       Renal/Genitourinary:  - ROS Renal section negative       Endo:     (+) Obesity, .      Psychiatric:  - neg psychiatric ROS       Infectious Disease:  - neg infectious disease ROS       Malignancy:      - no malignancy   Other:    - neg other ROS                 Physical Exam  Normal systems: cardiovascular, pulmonary and dental    Airway   Mallampati: II  TM distance: >3 FB  Neck ROM: full    Dental     Cardiovascular   Rhythm and rate: regular and normal      Pulmonary    breath sounds clear to auscultation                    Anesthesia Plan      History & Physical Review  History and physical reviewed and following examination; no interval change.    ASA Status:  2 .    NPO Status:  > 8 hours    Plan for MAC Maintenance will be TIVA.           Postoperative Care      Consents  Anesthetic plan, risks, benefits and alternatives discussed with:  Patient.  Use of blood products discussed: No .   .                          .

## 2017-10-09 NOTE — ANESTHESIA CARE TRANSFER NOTE
Patient: Howie Yang    Procedure(s):  Colonoscopy with polypectomies by snare - Wound Class: II-Clean Contaminated    Diagnosis: rectal bleeding  Diagnosis Additional Information: No value filed.    Anesthesia Type:   MAC     Note:  Airway :Room Air  Patient transferred to:Phase II        Vitals: (Last set prior to Anesthesia Care Transfer)    CRNA VITALS  10/9/2017 1246 - 10/9/2017 1320      10/9/2017             Resp Rate (observed): 19                Electronically Signed By: AKILA Roa CRNA  October 9, 2017  1:20 PM

## 2017-10-09 NOTE — IP AVS SNAPSHOT
MRN:[id number]                      After Visit Summary   10/9/2017    Howie Yang    MRN: [id number]           Thank you!     Thank you for choosing [facility] for your care. Our goal is always to provide you with excellent care. Hearing back from our patients is one way we can continue to improve our services. Please take a few minutes to complete the written survey that you may receive in the mail after you visit with us. Thank you!        Patient Information     Date Of Birth          1980        About your hospital stay     You were admitted on:  October 9, 2017 You last received care in the:  [facility]    You were discharged on:  October 9, 2017       Who to Call     For medical emergencies, please call 911.  For non-urgent questions about your medical care, please call your primary care provider or clinic, [phone]  For questions related to your surgery, please call your surgery clinic        Attending Provider     Provider Tian Meier MD [facility]       Primary Care Provider Office Phone # Fax #    [facility] [phone]      Your next 10 appointments already scheduled     Oct 18, 2017  1:20 PM CDT   SHORT with Tian Badillo MD   [facility]    [address] [place] [id number] [phone]              Further instructions from your care team         [facility]    Home Care Following Endoscopy          Activity:    You have just undergone an endoscopic procedure usually performed with conscious sedation.  Do not work or operate machinery (including a car) for at least 12 hours.      I encourage you to walk and attempt to pass this air as soon as possible.    Diet:    Return to the diet you were on before your procedure but eat lightly for the first 12-24 hours.    Drink plenty of water.    Resume any regular medications  "unless otherwise advised by your physician.  Please begin any new medication prescribed as a result of your procedure as directed by your physician.     If you had any biopsy or polyp removed please refrain from aspirin or aspirin products for 2 days.  If on Coumadin please restart as instructed by your physician.   Pain:    You may take Tylenol as needed for pain.  Expected Recovery:    You can expect some mild abdominal fullness and/or discomfort due to the air used to inflate your intestinal tract. It is also normal to have a mild sore throat after upper endoscopy.    Call Your Physician if You Have:    After Upper Endoscopy:  o Shoulder, back or chest pain.  o Difficulty breathing or swallowing.  o Vomiting blood.    After Colonoscopy:  o Worsening persisting abdominal pain which is worse with activity.  o Fevers (>101 degrees F), chills or shakes.  o Passage of continued blood with bowel movements.   Any questions or concerns about your recovery, please call [phone] or after hours [phone] Nurse Advice Line.    Follow-up Care:    You should receive a call or letter with your results within 1 week. Please call if you have not received a notification of your results.    If asked to return to clinic please make an appointment 1 week after your procedure.  Call [phone].        Pending Results     No orders found from 10/7/2017 to 10/10/2017.            Admission Information     Date & Time Provider Department Dept. Phone    10/9/2017 Tian Badillo MD [facility] [phone]      Your Vitals Were     Blood Pressure Pulse Temperature Respirations          122/91 85 98.1  F (36.7  C) (Oral) 16        MyChart Information     [employer] lets you send messages to your doctor, view your test results, renew your prescriptions, schedule appointments and more. To sign up, go to [email or web address] . Click on \"Log in\" on the left side of the screen, which will take you to the Welcome " "page. Then click on \"Sign up Now\" on the right side of the page.     You will be asked to enter the access code listed below, as well as some personal information. Please follow the directions to create your username and password.     Your access code is: [email or web address]  Expires: 2017  8:31 PM     Your access code will  in 90 days. If you need help or a new code, please call your [facility] clinic or [phone].        Care EveryWhere ID     This is your Care EveryWhere ID. This could be used by other organizations to access your [facility] medical records  [id number]        Equal Access to Services     [facility] : Fausto Henderson, destiny thakkar, eva zambrano, rishi garcia . So [facility] [phone].    ATENCIÓN: Si habla español, tiene a britton disposición servicios gratuitos de asistencia lingüística. Llame al [phone].    We comply with applicable federal civil rights laws and [place] laws. We do not discriminate on the basis of race, color, national origin, age, disability, sex, sexual orientation, or gender identity.               Review of your medicines      UNREVIEWED medicines. Ask your doctor about these medicines        Dose / Directions    chlorthalidone 25 MG tablet   Commonly known as:  HYGROTON   Used for:  Benign essential hypertension        Dose:  12.5 mg   Take 0.5 tablets (12.5 mg) by mouth daily   Quantity:  45 tablet   Refills:  1       IBUPROFEN PO        Dose:  600 mg   Take 600 mg by mouth   Refills:  0                Protect others around you: Learn how to safely use, store and throw away your medicines at www.disposemymeds.org.             Medication List: This is a list of all your medications and when to take them. Check marks below indicate your daily home schedule. Keep this list as a reference.      Medications           Morning Afternoon Evening Bedtime As Needed    chlorthalidone 25 MG tablet   Commonly known as:  " HYGROTON   Take 0.5 tablets (12.5 mg) by mouth daily                                IBUPROFEN PO   Take 600 mg by mouth

## 2017-10-11 LAB — COPATH REPORT: NORMAL

## 2017-10-25 ENCOUNTER — TELEPHONE (OUTPATIENT)
Dept: FAMILY MEDICINE | Facility: CLINIC | Age: 37
End: 2017-10-25

## 2017-10-25 DIAGNOSIS — G47.30 SLEEP APNEA, UNSPECIFIED TYPE: Primary | ICD-10-CM

## 2017-10-25 NOTE — TELEPHONE ENCOUNTER
----- Message from Tian Badillo MD sent at 10/25/2017  8:57 AM CDT -----  Regarding: FW: Sleep RX  Mony can you arrange? Thanks.      ----- Message -----     From: Lynsey Hinojosa     Sent: 10/23/2017   7:32 AM       To: Tian Badillo MD  Subject: Sleep RX                                         Dr Badillo - Your patient called to schedule with us here in Sleep I don't see an order for him please put one in.  Thanks Dian

## 2017-11-13 ENCOUNTER — OFFICE VISIT (OUTPATIENT)
Dept: SLEEP MEDICINE | Facility: CLINIC | Age: 37
End: 2017-11-13
Attending: FAMILY MEDICINE
Payer: COMMERCIAL

## 2017-11-13 VITALS
BODY MASS INDEX: 33.27 KG/M2 | HEIGHT: 73 IN | SYSTOLIC BLOOD PRESSURE: 125 MMHG | WEIGHT: 251 LBS | DIASTOLIC BLOOD PRESSURE: 79 MMHG | HEART RATE: 100 BPM

## 2017-11-13 DIAGNOSIS — I10 BENIGN ESSENTIAL HYPERTENSION: ICD-10-CM

## 2017-11-13 DIAGNOSIS — G47.30 SLEEP APNEA, UNSPECIFIED TYPE: ICD-10-CM

## 2017-11-13 PROCEDURE — 99244 OFF/OP CNSLTJ NEW/EST MOD 40: CPT | Performed by: PHYSICIAN ASSISTANT

## 2017-11-13 NOTE — MR AVS SNAPSHOT
"              After Visit Summary   11/13/2017    Howie Yang    MRN: [id number]           Patient Information     Date Of Birth          1980        Visit Information        Provider Department      11/13/2017 2:00 PM Deshawn Cooney PA-C [facility] [place]        Today's Diagnoses     Sleep apnea, unspecified type        Benign essential hypertension          Care Instructions    MY TREATMENT INFORMATION FOR SLEEP APNEA-  Howie Yang    DOCTOR : Deshawn Cooney  SLEEP CENTER :      MY CONTACT NUMBER:     Am I having a sleep study at a sleep center?  Make sure you have an appointment for the study before you leave!    Am I having a home sleep study?  Watch this video:  [email or web address].com/watch?v=CteI_[email or web address]&[email or web address]    Frequently asked questions:  1. What is Obstructive Sleep Apnea (AYANA)? AYANA is the most common type of sleep apnea. Apnea means, \"without breath.\"  Apnea is most often caused by narrowing or collapse of the upper airway as muscles relax during sleep.   Almost everyone has occasional apneas. Most people with sleep apnea have had brief interruptions at night frequently for many years.  The severity of sleep apnea is related to how frequent and severe the events are.   2. What are the consequences of AYANA? Symptoms include: feeling sleepy during the day, snoring loudly, gasping or stopping of breathing, trouble sleeping, and occasionally morning headaches or heartburn at night.  Sleepiness can be serious and even increase the risk of falling asleep while driving. Other health consequences may include development of high blood pressure and other cardiovascular disease in persons who are susceptible. Untreated AYANA  can contribute to heart disease, stroke and diabetes.   3. What are the treatment options? In most situations, sleep apnea is a lifelong disease that must be managed with daily therapy. Medications are not effective for sleep " apnea and surgery is generally not considered until other therapies have been tried. Your treatment is your choice . Continuous Positive Airway (CPAP) works right away and is the therapy that is effective in nearly everyone. An oral device to hold your jaw forward is usually the next most reliable option. Other options include postioning devices (to keep you off your back), weight loss, and surgery including a tongue pacing device. There is more detail about some of these options below.    Important tips for using CPAP and similar devices   Know your equipment:  CPAP is continuous positive airway pressure that prevents obstructive sleep apnea by keeping the throat from collapsing while you are sleeping. In most cases, the device is  smart  and can slowly self-adjusts if your throat collapses and keeps a record every day of how well you are treated-this information is available to you and your care team.  BPAP is bilevel positive airway pressure that keeps your throat open and also assists each breath with a pressure boost to maintain adequate breathing.  Special kinds of BPAP are used in patients who have inadequate breathing from lung or heart disease. In most cases, the device is  smart  and can slowly self-adjusts to assist breathing. Like CPAP, the device keeps a record of how well you are treated.  Your mask is your connection to the device. You get to choose what feels most comfortable and the staff will help to make sure if fits. Here: are some examples of the different masks that are available:       Key points to remember on your journey with sleep apnea:  1. Sleep study.  PAP devices often need to be adjusted during a sleep study to show that they are effective and adjusted right.  2. Good tips to remember: Try wearing just the mask during a quiet time during the day so your body adapts to wearing it. A humidifier is recommended for comfort in most cases to prevent drying of your nose and throat. Allergy  medication from your provider may help you if you are having nasal congestion.  3. Getting settled-in. It takes more than one night for most of us to get used to wearing a mask. Try wearing just the mask during a quiet time during the day so your body adapts to wearing it. A humidifier is recommended for comfort in most cases. Our team will work with you carefully on the first day and will be in contact within 4 days and again at 2 and 4 weeks for advice and remote device adjustments. Your therapy is evaluated by the device each day.   4. Use it every night. The more you are able to sleep naturally for 7-8 hours, the more likely you will have good sleep and to prevent health risks or symptoms from sleep apnea. Even if you use it 4 hours it helps. Occasionally all of us are unable to use a medical therapy, in sleep apnea, it is not dangerous to miss one night.   5. Communicate. Call our skilled team on the number provided on the first day if your visit for problems that make it difficult to wear the device. Over 2 out of 3 patients can learn to wear the device long-term with help from our team. Remember to call our team or your sleep providers if you are unable to wear the device as we may have other solutions for those who cannot adapt to mask CPAP therapy. It is recommended that you sleep your sleep provider within the first 3 months and yearly after that if you are not having problems.   Take care of your equipment. Make sure you clean your mask and tubing using directions every day and that your filter and mask are replaced as recommended or if they are not working.     BESIDES CPAP, WHAT OTHER THERAPIES ARE THERE?    Positioning Device  Positioning devices are generally used when sleep apnea is mild and only occurs on your back.This example shows a pillow that straps around the waist. It may be appropriate for those whose sleep study shows milder sleep apnea that occurs primarily when lying flat on one's back.  Preliminary studies have shown benefit but effectiveness at home may need to be verified by a home sleep test. These devices are generally not covered by medical insurance.  Examples of devices that maintain sleeping on the back to prevent snoring and mild sleep apnea.    Belt type body positioner  Http://Traitifyosa.com/    Electronic reminder  Http://nightshifttherapy.com/  Http://www.[employer].au/    Oral Appliance  What is oral appliance therapy?  An oral appliance device fits on your teeth at night like a retainer used after having braces. The device is made by a specialized dentist and requires several visits over 1-2 months before a manufactured device is made to fit your teeth and is adjusted to prevent your sleep apnea. Once an oral device is working properly, snoring should be improved. A home sleep test may be recommended at that time if to determine whether the sleep apnea is adequately treated.       Some things to remember:  -Oral devices are often, but not always, covered by your medical insurance. Be sure to check with your insurance provider.   -If you are referred for oral therapy, you will be given a list of specialized dentists to consider or you may choose to visit the Web site of the American Academy of Dental Sleep Medicine  -Oral devices are less likely to work if you have severe sleep apnea or are extremely overweight.     More detailed information  An oral appliance is a small acrylic device that fits over the upper and lower teeth  (similar to a retainer or a mouth guard). This device slightly moves jaw forward, which moves the base of the tongue forward, opens the airway, improves breathing for effective treat snoring and obstructive sleep apnea in perhaps 7 out of 10 people .  The best working devices are custom-made by a dental device  after a mold is made of the teeth 1, 2, 3.  When is an oral appliance indicated?  Oral appliance therapy is recommended as a first-line  treatment for patients with primary snoring, mild sleep apnea, and for patients with moderate sleep apnea who prefer appliance therapy to use of CPAP4, 5. Severity of sleep apnea is determined by sleep testing and is based on the number of respiratory events per hour of sleep.   How successful is oral appliance therapy?  The success rate of oral appliance therapy in patients with mild sleep apnea is 75-80% while in patients with moderate sleep apnea it is 50-70%. The chance of success in patients with severe sleep apnea is 40-50%. The research also shows that oral appliances have a beneficial effect on the cardiovascular health of AYANA patients at the same magnitude as CPAP therapy7.  Oral appliances should be a second-line treatment in cases of severe sleep apnea, but if not completely successful then a combination therapy utilizing CPAP plus oral appliance therapy may be effective. Oral appliances tend to be effective in a broad range of patients although studies show that the patients who have the highest success are females, younger patients, those with milder disease, and less severe obesity. 3, 6.   Finding a dentist that practices dental sleep medicine  Specific training is available through the American Academy of Dental Sleep Medicine for dentists interested in working in the field of sleep. To find a dentist who is educated in the field of sleep and the use of oral appliances, near you, visit the Web site of the American Academy of Dental Sleep Medicine.    References  1. Becky et al. Objectively measured vs self-reported compliance during oral appliance therapy for sleep-disordered breathing. Chest 2013; 144(5): 6629-5407.  2. Marco et al. Objective measurement of compliance during oral appliance therapy for sleep-disordered breathing. Thorax 2013; 68(1): 91-96.  3. Aleisha et al. Mandibular advancement devices in 620 men and women with AYANA and snoring: tolerability and predictors of  treatment success. Chest 2004; 125: 7690-6066.  4. Liza et al. Oral appliances for snoring and AYANA: a review. Sleep 2006; 29: 244-262.  5. Jaspreet et al. Oral appliance treatment for AYANA: an update. J Clin Sleep Med 2014; 10(2): 215-227.  6. Juaquin et al. Predictors of OSAH treatment outcome. J Dent Res 2007; 86: 4022-3510.      Weight Loss:    Weight loss is a long-term strategy that may improve sleep apnea in some patients.    Weight management is a personal decision.  If you are interested in exploring weight loss strategies, the following discussion covers the impact on weight loss on sleep apnea and the approaches that may be successful.    Weight loss decreases severity of sleep apnea in most people with obesity. For those with mild obesity who have developed snoring with weight gain, even 15-30 pound weight loss can improve and occasionally eliminate sleep apnea.  Structured and life-long dietary and health habits are necessary to lose weight and keep healthier weight levels.     Though there may be significant health benefits from weight loss, long-term weight loss is very difficult to achieve- studies show success with dietary management in less than 10% of people. In addition, substantial weight loss may require years of dietary control and may be difficult if patients have severe obesity. In these cases, surgical management may be considered.  Finally, older individuals who have tolerated obesity without health complications may be less likely to benefit from weight loss strategies.        Your BMI is Body mass index is 33.12 kg/(m^2).  Weight management is a personal decision.  If you are interested in exploring weight loss strategies, the following discussion covers the approaches that may be successful. Body mass index (BMI) is one way to tell whether you are at a healthy weight, overweight, or obese. It measures your weight in relation to your height.  A BMI of 18.5 to 24.9 is in the  healthy range. A person with a BMI of 25 to 29.9 is considered overweight, and someone with a BMI of 30 or greater is considered obese. More than two-thirds of American adults are considered overweight or obese.  Being overweight or obese increases the risk for further weight gain. Excess weight may lead to heart disease and diabetes.  Creating and following plans for healthy eating and physical activity may help you improve your health.  Weight control is part of healthy lifestyle and includes exercise, emotional health, and healthy eating habits. Careful eating habits lifelong are the mainstay of weight control. Though there are significant health benefits from weight loss, long-term weight loss with diet alone may be very difficult to achieve- studies show long-term success with dietary management in less than 10% of people. Attaining a healthy weight may be especially difficult to achieve in those with severe obesity. In some cases, medications, devices and surgical management might be considered.  What can you do?  If you are overweight or obese and are interested in methods for weight loss, you should discuss this with your provider.     Consider reducing daily calorie intake by 500 calories.     Keep a food journal.     Avoiding skipping meals, consider cutting portions instead.    Diet combined with exercise helps maintain muscle while optimizing fat loss. Strength training is particularly important for building and maintaining muscle mass. Exercise helps reduce stress, increase energy, and improves fitness. Increasing exercise without diet control, however, may not burn enough calories to loose weight.       Start walking three days a week 10-20 minutes at a time    Work towards walking thirty minutes five days a week     Eventually, increase the speed of your walking for 1-2 minutes at time    In addition, we recommend that you review healthy lifestyles and methods for weight loss available through the  National Institutes of Health patient information sites:  http://win.niddk.nih.gov/publications/index.htm    And look into health and wellness programs that may be available through your health insurance provider, employer, local community center, or [employer].    Weight management plan: Patient was referred to their PCP to discuss a diet and exercise plan.      Surgery:    Surgery for obstructive sleep apnea is considered generally only when other therapies fail to work. Surgery may be discussed with you if you are having a difficult time tolerating CPAP and or when there is an abnormal structure that requires surgical correction.  Nose and throat surgeries often enlarge the airway to prevent collapse.  Most of these surgeries create pain for 1-2 weeks and up to half of the most common surgeries are not effective throughout life.  You should carefully discuss the benefits and drawbacks to surgery with your sleep provider and surgeon to determine if it is the best solution for you.   More information  Surgery for AYANA is directed at areas that are responsible for narrowing or complete obstruction of the airway during sleep.  There are a wide range of procedures available to enlarge and/or stabilize the airway to prevent blockage of breathing in the three major areas where it can occur: the palate, tongue, and nasal regions.  Successful surgical treatment depends on the accurate identification of the factors responsible for obstructive sleep apnea in each person.  A personalized approach is required because there is no single treatment that works well for everyone.  Because of anatomic variation, consultation with an examination by a sleep surgeon is a critical first step in determining what surgical options are best for each patient.  In some cases, examination during sedation may be recommended in order to guide the selection of procedures.  Patients will be counseled about risks and benefits as well as the typical  recovery course after surgery. Surgery is typically not a cure for a person s AYANA.  However, surgery will often significantly improve one s AYANA severity (termed  success rate ).  Even in the absence of a cure, surgery will decrease the cardiovascular risk associated with OSA7; improve overall quality of life8 (sleepiness, functionality, sleep quality, etc).      Palate Procedures:  Patients with AYANA often have narrowing of their airway in the region of their tonsils and uvula.  The goals of palate procedures are to widen the airway in this region as well as to help the tissues resist collapse.  Modern palate procedure techniques focus on tissue conservation and soft tissue rearrangement, rather than tissue removal.  Often the uvula is preserved in this procedure. Residual sleep apnea is common in patient after pharyngoplasty with an average reduction in sleep apnea events of 33%2.      Tongue Procedures:  ExamWhile patients are awake, the muscles that surround the throat are active and keep this region open for breathing. These muscles relax during sleep, allowing the tongue and other structures to collapse and block breathing.  There are several different tongue procedures available.  Selection of a tongue base procedure depends on characteristics seen on physical exam.  Generally, procedures are aimed at removing bulky tissues in this area or preventing the back of the tongue from falling back during sleep.  Success rates for tongue surgery range from 50-62%3.    Hypoglossal Nerve Stimulation:  Hypoglossal nerve stimulation has recently received approval from the [place] Food and Drug Administration for the treatment of obstructive sleep apnea.  This is based on research showing that the system was safe and effective in treating sleep apnea6.  Results showed that the median AHI score decreased 68%, from 29.3 to 9.0. This therapy uses an implant system that senses breathing patterns and delivers mild  stimulation to airway muscles, which keeps the airway open during sleep.  The system consists of three fully implanted components: a small generator (similar in size to a pacemaker), a breathing sensor, and a stimulation lead.  Using a small handheld remote, a patient turns the therapy on before bed and off upon awakening.    Candidates for this device must be greater than 22 years of age, have moderate to severe AYANA (AHI between 20-65), BMI less than 32, have tried CPAP/oral appliance without success, and have appropriate upper airway anatomy (determined by a sleep endoscopy performed by Dr. Eagle).    Hypoglossal Nerve Stimulation Pathway:    The sleep surgeon s office will work with the patient through the insurance prior-authorization process (including communications and appeals).    Nasal Procedures:  Nasal obstruction can interfere with nasal breathing during the day and night.  Studies have shown that relief of nasal obstruction can improve the ability of some patients to tolerate positive airway pressure therapy for obstructive sleep apnea1.  Treatment options include medications such as nasal saline, topical corticosteroid and antihistamine sprays, and oral medications such as antihistamines or decongestants. Non-surgical treatments can include external nasal dilators for selected patients. If these are not successful by themselves, surgery can improve the nasal airway either alone or in combination with these other options.      Combination Procedures:  Combination of surgical procedures and other treatments may be recommended, particularly if patients have more than one area of narrowing or persistent positional disease.  The success rate of combination surgery ranges from 66-80%2,3.    References  1. Johnson SANCHEZ. The Role of the Nose in Snoring and Obstructive Sleep Apnoea: An Update.  Eur Arch Otorhinolaryngol. 2011; 268: 1365-73.  2.  Dougie SM; Lynda JA; Baylee JR; Pallanch JF; Paul DESAI; Emigdio SG;  Alex GILBERT. Surgical modifications of the upper airway for obstructive sleep apnea in adults: a systematic review and meta-analysis. SLEEP 2010;33(10):0940-7142. Umang UGALDE. Hypopharyngeal surgery in obstructive sleep apnea: an evidence-based medicine review.  Arch Otolaryngol Head Neck Surg. 2006 Feb;132(2):206-13.  3. Win YH1, Bairon Y, Pedro Luis ELIER. The efficacy of anatomically based multilevel surgery for obstructive sleep apnea. Otolaryngol Head Neck Surg. 2003 Oct;129(4):327-35.  4. mUang UGALDE, Goldberg A. Hypopharyngeal Surgery in Obstructive Sleep Apnea: An Evidence-Based Medicine Review. Arch Otolaryngol Head Neck Surg. 2006 Feb;132(2):206-13.  5. Mir NOLASCO et al. Upper-Airway Stimulation for Obstructive Sleep Apnea.  N Engl J Med. 2014 Jan 9;370(2):139-49.  6. Carlie Y et al. Increased Incidence of Cardiovascular Disease in Middle-aged Men with Obstructive Sleep Apnea. Am J Respir Crit Care Med; 2002 166: 159-165  7. Craft EM et al. Studying Life Effects and Effectiveness of Palatopharyngoplasty (SLEEP) study: Subjective Outcomes of Isolated Uvulopalatopharyngoplasty. Otolaryngol Head Neck Surg. 2011; 144: 623-631.                    Follow-ups after your visit        Follow-up notes from your care team     Return in about 2 weeks (around 11/27/2017).      Future tests that were ordered for you today     Open Future Orders        Priority Expected Expires Ordered    Comprehensive Sleep Study Routine  5/12/2018 11/13/2017            Who to contact     If you have questions or need follow up information about today's clinic visit or your schedule please contact [facility] directly at [phone].  Normal or non-critical lab and imaging results will be communicated to you by MyChart, letter or phone within 4 business days after the clinic has received the results. If you do not hear from us within 7 days, please contact the clinic through MyChart or phone. If you have a critical or  "abnormal lab result, we will notify you by phone as soon as possible.  Submit refill requests through [email or web address] or call your pharmacy and they will forward the refill request to us. Please allow 3 business days for your refill to be completed.          Additional Information About Your Visit        MyChart Information     [email or web address] lets you send messages to your doctor, view your test results, renew your prescriptions, schedule appointments and more. To sign up, go to [email or web address] . Click on \"Log in\" on the left side of the screen, which will take you to the Welcome page. Then click on \"Sign up Now\" on the right side of the page.     You will be asked to enter the access code listed below, as well as some personal information. Please follow the directions to create your username and password.     Your access code is: [email or web address]  Expires: 2017  7:31 PM     Your access code will  in 90 days. If you need help or a new code, please call your [place] clinic or [phone].        Care EveryWhere ID     This is your Care EveryWhere ID. This could be used by other organizations to access your [place] medical records  [id number]        Your Vitals Were     Pulse Height BMI (Body Mass Index)             100 1.854 m (6' 1\") 33.12 kg/m2          Blood Pressure from Last 3 Encounters:   17 125/79   10/09/17 (!) 122/91   17 (!) 150/102    Weight from Last 3 Encounters:   17 113.9 kg (251 lb)   17 116.7 kg (257 lb 4.8 oz)   17 124.7 kg (275 lb)              We Performed the Following     SLEEP EVALUATION & MANAGEMENT REFERRAL - ADULT -[facility] [place] [phone] (Age 13 if over 100 lbs)        Primary Care Provider Office Phone # Fax #    [facility] [phone]       [address] [facility] [address]        Equal Access to Services     TRUE HALE AH: Fausto Henderson, destiny thakkar, qaybmaxwell hearn " rishi zambranodanagely la'aan ah. [facility] [phone].    ATENCIÓN: Si habla teresa, tiene a britton disposición servicios gratuitos de asistencia lingüística. Shad al [phone].    We comply with applicable federal civil rights laws and [place] laws. We do not discriminate on the basis of race, color, national origin, age, disability, sex, sexual orientation, or gender identity.            Thank you!     Thank you for choosing [facility]  for your care. Our goal is always to provide you with excellent care. Hearing back from our patients is one way we can continue to improve our services. Please take a few minutes to complete the written survey that you may receive in the mail after your visit with us. Thank you!             Your Updated Medication List - Protect others around you: Learn how to safely use, store and throw away your medicines at www.disposemymeds.org.          This list is accurate as of: 11/13/17  2:37 PM.  Always use your most recent med list.                   Brand Name Dispense Instructions for use Diagnosis    chlorthalidone 25 MG tablet    HYGROTON    45 tablet    Take 0.5 tablets (12.5 mg) by mouth daily    Benign essential hypertension       IBUPROFEN PO      Take 600 mg by mouth

## 2017-11-13 NOTE — PROGRESS NOTES
Sleep Consultation:    Date on this visit: 11/13/2017    Howie Yang is sent by Tian Badillo for a sleep consultation regarding snoring, fatigue.     Primary Physician: Clinic, [facility]     Howie Yang reports nightly snoring and snorting for 20 years, worse the last few years. .     Howie goes to sleep at 11:00 PM during the week. He wakes up at 6:30 AM with an alarm. He falls asleep in 15 minutes.  Howie denies difficulty falling asleep.  He wakes up 2-4 times a night for 5 minutes before falling back to sleep.  Howie wakes up to snoring.  On weekends, Howie goes to sleep at 11:30 PM.  He wakes up at 7:00 AM without an alarm. He falls asleep in 15 minutes.  Patient gets an average of 7 hours of sleep per night.     Patient does use electronics in bed and read in bed.     Howie does not do shift work.  He works day shifts.      Howie does snore every night. Patient does have a regular bed partner. There is report of snoring.  He does not have witnessed apneas. They never sleep separately.  Patient sleeps on his side and stomach. He has occasional snort arousals, morning dry mouth and morning headaches,. Howie denies any bruxism, sleep walking, sleep talking, dream enactment, sleep paralysis, cataplexy and hypnogogic/hypnopompic hallucinations.    He denies sleep walking, sleep talking and sleep terrors as a child.  Howie denies difficulty breathing through his nose, claustrophobia, reflux at night, heartburn and depression.      Howie has lost 15-20 pounds in the last year.  Patient describes themself as neither a morning or night person.  He would prefer to go to sleep at 11:00 PM and wake up at 7:00 AM.  Patient's Bear Sleepiness score 6/24 inconsistent with excessive  daytime sleepiness.      Howie naps 1-2 times per week for 30-60 minutes, feels refreshed after naps. He takes no inadvertant naps.  He denies closing eyes, dozing and falling asleep while driving.  Patient was counseled on the importance of driving while alert, to pull over if drowsy, or nap before getting into the vehicle if sleepy.  He uses [employer] 2 times a week. Last caffeine intake is usually before noon.    Allergies:    Allergies   Allergen Reactions     No Known Drug Allergies        Medications:    Current Outpatient Prescriptions   Medication Sig Dispense Refill     chlorthalidone (HYGROTON) 25 MG tablet Take 0.5 tablets (12.5 mg) by mouth daily 45 tablet 1     IBUPROFEN PO Take 600 mg by mouth         Problem List:  Patient Active Problem List    Diagnosis Date Noted     CARDIOVASCULAR SCREENING; LDL GOAL LESS THAN 160 10/31/2010     Priority: Medium        Past Medical/Surgical History:  Past Medical History:   Diagnosis Date     NO ACTIVE PROBLEMS      Past Surgical History:   Procedure Laterality Date     VASECTOMY  2/4/2016       Social History:  Social History     Social History     Marital status:      Spouse name: N/A     Number of children: 0     Years of education: N/A     Occupational History           works on cars      [employer]     Social History Main Topics     Smoking status: Former Smoker     Smokeless tobacco: Former User     Types: Snuff     Alcohol use No     Drug use: No     Sexual activity: Yes     Partners: Female     Birth control/ protection: Surgical      Comment: vasectomy     Other Topics Concern     Not on file     Social History Narrative       Family History:  Family History   Problem Relation Age of Onset     Hypertension Father      Hypertension Brother        Review of Systems:  A complete review of systems reviewed by me is negative with the exeption of what has been mentioned in the history of present illness.  CONSTITUTIONAL:  POSITIVE for  weight loss  EYES: NEGATIVE for changes in vision, blind spots, double vision.  ENT: NEGATIVE for ear pain, sore throat, sinus pain, post-nasal drip, runny nose, bloody nose  CARDIAC: NEGATIVE for fast heartbeats  "or fluttering in chest, chest pain or pressure, breathlessness when lying flat, swollen legs or swollen feet.  NEUROLOGIC: NEGATIVE headaches, weakness or numbness in the arms or legs.  DERMATOLOGIC: NEGATIVE for rashes, new moles or change in mole(s)  PULMONARY: NEGATIVE SOB at rest, SOB with activity, dry cough, productive cough, coughing up blood, wheezing or whistling when breathing.    GASTROINTESTINAL: NEGATIVE for nausea or vomitting, loose or watery stools, fat or grease in stools, constipation, abdominal pain, bowel movements black in color or blood noted.  GENITOURINARY: NEGATIVE for pain during urination, blood in urine, urinating more frequently than usual, irregular menstrual periods.  MUSCULOSKELETAL: NEGATIVE for muscle pain, bone or joint pain, swollen joints.  ENDOCRINE: NEGATIVE for increased thirst or urination, diabetes.  LYMPHATIC: NEGATIVE for swollen lymph nodes, lumps or bumps in the breasts or nipple discharge.    Physical Examination:  Vitals: Ht 6' 1\" (1.854 m)  BMI= Body mass index is 33.12 kg/(m^2).         No flowsheet data found.    GENERAL APPEARANCE: healthy, alert and no distress  HENT: nose and mouth without ulcers or lesions, oropharynx crowded and nasal mucosa edematous without rhinorrhea  NECK: no adenopathy, no asymmetry, masses, or scars, thyroid normal to palpation and trachea midline and normal to palpation  RESP: lungs clear to auscultation - no rales, rhonchi or wheezes  CV: regular rates and rhythm, normal S1 S2, no S3 or S4 and no murmur, click or rub  MS: extremities normal- no gross deformities noted  PSYCH: mentation appears normal and affect normal/bright  Mallampati Class: II.  Tonsillar Stage: 2  visible at pillars.    Impression/Plan:  Snoring, daytime fatigue. Will set up a split night study   Literature provided regarding sleep apnea.      He will follow up with me in approximately two weeks after his sleep study has been competed to review the results and " discuss plan of care.       Polysomnography reviewed.  Obstructive sleep apnea reviewed.  Complications of untreated sleep apnea were reviewed.        CC: Tian Badillo

## 2017-11-13 NOTE — PATIENT INSTRUCTIONS
"MY TREATMENT INFORMATION FOR SLEEP APNEA-  Howie Yang    DOCTOR : Deshawn Fitzgerald  SLEEP CENTER :      MY CONTACT NUMBER:     Am I having a sleep study at a sleep center?  Make sure you have an appointment for the study before you leave!    Am I having a home sleep study?  Watch this video:  [email or web address]?[email or web address]    Frequently asked questions:  1. What is Obstructive Sleep Apnea (AYANA)? AYANA is the most common type of sleep apnea. Apnea means, \"without breath.\"  Apnea is most often caused by narrowing or collapse of the upper airway as muscles relax during sleep.   Almost everyone has occasional apneas. Most people with sleep apnea have had brief interruptions at night frequently for many years.  The severity of sleep apnea is related to how frequent and severe the events are.   2. What are the consequences of AYANA? Symptoms include: feeling sleepy during the day, snoring loudly, gasping or stopping of breathing, trouble sleeping, and occasionally morning headaches or heartburn at night.  Sleepiness can be serious and even increase the risk of falling asleep while driving. Other health consequences may include development of high blood pressure and other cardiovascular disease in persons who are susceptible. Untreated AYANA  can contribute to heart disease, stroke and diabetes.   3. What are the treatment options? In most situations, sleep apnea is a lifelong disease that must be managed with daily therapy. Medications are not effective for sleep apnea and surgery is generally not considered until other therapies have been tried. Your treatment is your choice . Continuous Positive Airway (CPAP) works right away and is the therapy that is effective in nearly everyone. An oral device to hold your jaw forward is usually the next most reliable option. Other options include postioning devices (to keep you off your back), weight loss, and surgery including a " tongue pacing device. There is more detail about some of these options below.    Important tips for using CPAP and similar devices   Know your equipment:  CPAP is continuous positive airway pressure that prevents obstructive sleep apnea by keeping the throat from collapsing while you are sleeping. In most cases, the device is  smart  and can slowly self-adjusts if your throat collapses and keeps a record every day of how well you are treated-this information is available to you and your care team.  BPAP is bilevel positive airway pressure that keeps your throat open and also assists each breath with a pressure boost to maintain adequate breathing.  Special kinds of BPAP are used in patients who have inadequate breathing from lung or heart disease. In most cases, the device is  smart  and can slowly self-adjusts to assist breathing. Like CPAP, the device keeps a record of how well you are treated.  Your mask is your connection to the device. You get to choose what feels most comfortable and the staff will help to make sure if fits. Here: are some examples of the different masks that are available:       Key points to remember on your journey with sleep apnea:  1. Sleep study.  PAP devices often need to be adjusted during a sleep study to show that they are effective and adjusted right.  2. Good tips to remember: Try wearing just the mask during a quiet time during the day so your body adapts to wearing it. A humidifier is recommended for comfort in most cases to prevent drying of your nose and throat. Allergy medication from your provider may help you if you are having nasal congestion.  3. Getting settled-in. It takes more than one night for most of us to get used to wearing a mask. Try wearing just the mask during a quiet time during the day so your body adapts to wearing it. A humidifier is recommended for comfort in most cases. Our team will work with you carefully on the first day and will be in contact within 4  days and again at 2 and 4 weeks for advice and remote device adjustments. Your therapy is evaluated by the device each day.   4. Use it every night. The more you are able to sleep naturally for 7-8 hours, the more likely you will have good sleep and to prevent health risks or symptoms from sleep apnea. Even if you use it 4 hours it helps. Occasionally all of us are unable to use a medical therapy, in sleep apnea, it is not dangerous to miss one night.   5. Communicate. Call our skilled team on the number provided on the first day if your visit for problems that make it difficult to wear the device. Over 2 out of 3 patients can learn to wear the device long-term with help from our team. Remember to call our team or your sleep providers if you are unable to wear the device as we may have other solutions for those who cannot adapt to mask CPAP therapy. It is recommended that you sleep your sleep provider within the first 3 months and yearly after that if you are not having problems.   Take care of your equipment. Make sure you clean your mask and tubing using directions every day and that your filter and mask are replaced as recommended or if they are not working.     BESIDES CPAP, WHAT OTHER THERAPIES ARE THERE?    Positioning Device  Positioning devices are generally used when sleep apnea is mild and only occurs on your back.This example shows a pillow that straps around the waist. It may be appropriate for those whose sleep study shows milder sleep apnea that occurs primarily when lying flat on one's back. Preliminary studies have shown benefit but effectiveness at home may need to be verified by a home sleep test. These devices are generally not covered by medical insurance.  Examples of devices that maintain sleeping on the back to prevent snoring and mild sleep apnea.    Belt type body positioner  Http://[employer]obeSherpa/    Electronic reminder  Http://nightshifttherapy.com/  Http://www.[employer].com.au/    Oral  Appliance  What is oral appliance therapy?  An oral appliance device fits on your teeth at night like a retainer used after having braces. The device is made by a specialized dentist and requires several visits over 1-2 months before a manufactured device is made to fit your teeth and is adjusted to prevent your sleep apnea. Once an oral device is working properly, snoring should be improved. A home sleep test may be recommended at that time if to determine whether the sleep apnea is adequately treated.       Some things to remember:  -Oral devices are often, but not always, covered by your medical insurance. Be sure to check with your insurance provider.   -If you are referred for oral therapy, you will be given a list of specialized dentists to consider or you may choose to visit the Web site of the American Academy of Dental Sleep Medicine  -Oral devices are less likely to work if you have severe sleep apnea or are extremely overweight.     More detailed information  An oral appliance is a small acrylic device that fits over the upper and lower teeth  (similar to a retainer or a mouth guard). This device slightly moves jaw forward, which moves the base of the tongue forward, opens the airway, improves breathing for effective treat snoring and obstructive sleep apnea in perhaps 7 out of 10 people .  The best working devices are custom-made by a dental device  after a mold is made of the teeth 1, 2, 3.  When is an oral appliance indicated?  Oral appliance therapy is recommended as a first-line treatment for patients with primary snoring, mild sleep apnea, and for patients with moderate sleep apnea who prefer appliance therapy to use of CPAP4, 5. Severity of sleep apnea is determined by sleep testing and is based on the number of respiratory events per hour of sleep.   How successful is oral appliance therapy?  The success rate of oral appliance therapy in patients with mild sleep apnea is 75-80% while  in patients with moderate sleep apnea it is 50-70%. The chance of success in patients with severe sleep apnea is 40-50%. The research also shows that oral appliances have a beneficial effect on the cardiovascular health of AYANA patients at the same magnitude as CPAP therapy7.  Oral appliances should be a second-line treatment in cases of severe sleep apnea, but if not completely successful then a combination therapy utilizing CPAP plus oral appliance therapy may be effective. Oral appliances tend to be effective in a broad range of patients although studies show that the patients who have the highest success are females, younger patients, those with milder disease, and less severe obesity. 3, 6.   Finding a dentist that practices dental sleep medicine  Specific training is available through the American Academy of Dental Sleep Medicine for dentists interested in working in the field of sleep. To find a dentist who is educated in the field of sleep and the use of oral appliances, near you, visit the Web site of the American Academy of Dental Sleep Medicine.    References  1. Becky, et al. Objectively measured vs self-reported compliance during oral appliance therapy for sleep-disordered breathing. Chest 2013; 144(5): 5766-7394.  2. Marco, et al. Objective measurement of compliance during oral appliance therapy for sleep-disordered breathing. Thorax 2013; 68(1): 91-96.  3. Aleisha, et al. Mandibular advancement devices in 620 men and women with AYANA and snoring: tolerability and predictors of treatment success. Chest 2004; 125: 9025-5940.  4. De Jesus, et al. Oral appliances for snoring and AYANA: a review. Sleep 2006; 29: 244-262.  5. Jaspreet et al. Oral appliance treatment for AYANA: an update. J Clin Sleep Med 2014; 10(2): 215-227.  6. Juaquin, et al. Predictors of OSAH treatment outcome. J Dent Res 2007; 86: 1522-0388.      Weight Loss:    Weight loss is a long-term strategy that may improve sleep apnea  in some patients.    Weight management is a personal decision.  If you are interested in exploring weight loss strategies, the following discussion covers the impact on weight loss on sleep apnea and the approaches that may be successful.    Weight loss decreases severity of sleep apnea in most people with obesity. For those with mild obesity who have developed snoring with weight gain, even 15-30 pound weight loss can improve and occasionally eliminate sleep apnea.  Structured and life-long dietary and health habits are necessary to lose weight and keep healthier weight levels.     Though there may be significant health benefits from weight loss, long-term weight loss is very difficult to achieve- studies show success with dietary management in less than 10% of people. In addition, substantial weight loss may require years of dietary control and may be difficult if patients have severe obesity. In these cases, surgical management may be considered.  Finally, older individuals who have tolerated obesity without health complications may be less likely to benefit from weight loss strategies.        Your BMI is Body mass index is 33.12 kg/(m^2).  Weight management is a personal decision.  If you are interested in exploring weight loss strategies, the following discussion covers the approaches that may be successful. Body mass index (BMI) is one way to tell whether you are at a healthy weight, overweight, or obese. It measures your weight in relation to your height.  A BMI of 18.5 to 24.9 is in the healthy range. A person with a BMI of 25 to 29.9 is considered overweight, and someone with a BMI of 30 or greater is considered obese. More than two-thirds of American adults are considered overweight or obese.  Being overweight or obese increases the risk for further weight gain. Excess weight may lead to heart disease and diabetes.  Creating and following plans for healthy eating and physical activity may help you improve  your health.  Weight control is part of healthy lifestyle and includes exercise, emotional health, and healthy eating habits. Careful eating habits lifelong are the mainstay of weight control. Though there are significant health benefits from weight loss, long-term weight loss with diet alone may be very difficult to achieve- studies show long-term success with dietary management in less than 10% of people. Attaining a healthy weight may be especially difficult to achieve in those with severe obesity. In some cases, medications, devices and surgical management might be considered.  What can you do?  If you are overweight or obese and are interested in methods for weight loss, you should discuss this with your provider.     Consider reducing daily calorie intake by 500 calories.     Keep a food journal.     Avoiding skipping meals, consider cutting portions instead.    Diet combined with exercise helps maintain muscle while optimizing fat loss. Strength training is particularly important for building and maintaining muscle mass. Exercise helps reduce stress, increase energy, and improves fitness. Increasing exercise without diet control, however, may not burn enough calories to loose weight.       Start walking three days a week 10-20 minutes at a time    Work towards walking thirty minutes five days a week     Eventually, increase the speed of your walking for 1-2 minutes at time    In addition, we recommend that you review healthy lifestyles and methods for weight loss available through the National Institutes of Health patient information sites:  http://win.niddk.nih.gov/publications/index.htm    And look into health and wellness programs that may be available through your health insurance provider, employer, local community center, or [employer].    Weight management plan: Patient was referred to their PCP to discuss a diet and exercise plan.      Surgery:    Surgery for obstructive sleep apnea is considered  generally only when other therapies fail to work. Surgery may be discussed with you if you are having a difficult time tolerating CPAP and or when there is an abnormal structure that requires surgical correction.  Nose and throat surgeries often enlarge the airway to prevent collapse.  Most of these surgeries create pain for 1-2 weeks and up to half of the most common surgeries are not effective throughout life.  You should carefully discuss the benefits and drawbacks to surgery with your sleep provider and surgeon to determine if it is the best solution for you.   More information  Surgery for AYANA is directed at areas that are responsible for narrowing or complete obstruction of the airway during sleep.  There are a wide range of procedures available to enlarge and/or stabilize the airway to prevent blockage of breathing in the three major areas where it can occur: the palate, tongue, and nasal regions.  Successful surgical treatment depends on the accurate identification of the factors responsible for obstructive sleep apnea in each person.  A personalized approach is required because there is no single treatment that works well for everyone.  Because of anatomic variation, consultation with an examination by a sleep surgeon is a critical first step in determining what surgical options are best for each patient.  In some cases, examination during sedation may be recommended in order to guide the selection of procedures.  Patients will be counseled about risks and benefits as well as the typical recovery course after surgery. Surgery is typically not a cure for a person s AYANA.  However, surgery will often significantly improve one s AYANA severity (termed  success rate ).  Even in the absence of a cure, surgery will decrease the cardiovascular risk associated with OSA7; improve overall quality of life8 (sleepiness, functionality, sleep quality, etc).      Palate Procedures:  Patients with AYANA often have narrowing of  their airway in the region of their tonsils and uvula.  The goals of palate procedures are to widen the airway in this region as well as to help the tissues resist collapse.  Modern palate procedure techniques focus on tissue conservation and soft tissue rearrangement, rather than tissue removal.  Often the uvula is preserved in this procedure. Residual sleep apnea is common in patient after pharyngoplasty with an average reduction in sleep apnea events of 33%2.      Tongue Procedures:  ExamWhile patients are awake, the muscles that surround the throat are active and keep this region open for breathing. These muscles relax during sleep, allowing the tongue and other structures to collapse and block breathing.  There are several different tongue procedures available.  Selection of a tongue base procedure depends on characteristics seen on physical exam.  Generally, procedures are aimed at removing bulky tissues in this area or preventing the back of the tongue from falling back during sleep.  Success rates for tongue surgery range from 50-62%3.    Hypoglossal Nerve Stimulation:  Hypoglossal nerve stimulation has recently received approval from the [place] Food and Drug Administration for the treatment of obstructive sleep apnea.  This is based on research showing that the system was safe and effective in treating sleep apnea6.  Results showed that the median AHI score decreased 68%, from 29.3 to 9.0. This therapy uses an implant system that senses breathing patterns and delivers mild stimulation to airway muscles, which keeps the airway open during sleep.  The system consists of three fully implanted components: a small generator (similar in size to a pacemaker), a breathing sensor, and a stimulation lead.  Using a small handheld remote, a patient turns the therapy on before bed and off upon awakening.    Candidates for this device must be greater than 22 years of age, have moderate to severe AYANA (AHI between  20-65), BMI less than 32, have tried CPAP/oral appliance without success, and have appropriate upper airway anatomy (determined by a sleep endoscopy performed by Dr. Eagle).    Hypoglossal Nerve Stimulation Pathway:    The sleep surgeon s office will work with the patient through the insurance prior-authorization process (including communications and appeals).    Nasal Procedures:  Nasal obstruction can interfere with nasal breathing during the day and night.  Studies have shown that relief of nasal obstruction can improve the ability of some patients to tolerate positive airway pressure therapy for obstructive sleep apnea1.  Treatment options include medications such as nasal saline, topical corticosteroid and antihistamine sprays, and oral medications such as antihistamines or decongestants. Non-surgical treatments can include external nasal dilators for selected patients. If these are not successful by themselves, surgery can improve the nasal airway either alone or in combination with these other options.      Combination Procedures:  Combination of surgical procedures and other treatments may be recommended, particularly if patients have more than one area of narrowing or persistent positional disease.  The success rate of combination surgery ranges from 66-80%2,3.    References  1. Johnson SANCHEZ. The Role of the Nose in Snoring and Obstructive Sleep Apnoea: An Update.  Eur Arch Otorhinolaryngol. 2011; 268: 1365-73.  2.  Dougie SM; Lynda JA; Baylee JR; Pallanch JF; Paul MB; Emigdio SG; Alex GILBERT. Surgical modifications of the upper airway for obstructive sleep apnea in adults: a systematic review and meta-analysis. SLEEP 2010;33(10):7323-9963. Umang UGALDE. Hypopharyngeal surgery in obstructive sleep apnea: an evidence-based medicine review.  Arch Otolaryngol Head Neck Surg. 2006 Feb;132(2):206-13.  3. Win GRACIAH1, Bairon Y, Pedro Luis ELIER. The efficacy of anatomically based multilevel surgery for obstructive sleep apnea.  Otolaryngol Head Neck Surg. 2003 Oct;129(4):327-35.  4. Umang UGALDE, Goldberg A. Hypopharyngeal Surgery in Obstructive Sleep Apnea: An Evidence-Based Medicine Review. Arch Otolaryngol Head Neck Surg. 2006 Feb;132(2):206-13.  5. Mir NOLASCO et al. Upper-Airway Stimulation for Obstructive Sleep Apnea.  N Engl J Med. 2014 Jan 9;370(2):139-49.  6. Carlie Y et al. Increased Incidence of Cardiovascular Disease in Middle-aged Men with Obstructive Sleep Apnea. Am J Respir Crit Care Med; 2002 166: 159-165  7. Craft EM et al. Studying Life Effects and Effectiveness of Palatopharyngoplasty (SLEEP) study: Subjective Outcomes of Isolated Uvulopalatopharyngoplasty. Otolaryngol Head Neck Surg. 2011; 144: 623-631.

## 2017-11-13 NOTE — NURSING NOTE
"Chief Complaint   Patient presents with     Consult     Referred by Dr. Badillo     Sleep Apnea     Hypertension       Initial Ht 6' 1\" (1.854 m) Estimated body mass index is 33.95 kg/(m^2) as calculated from the following:    Height as of 9/13/17: 6' 1\" (1.854 m).    Weight as of 9/21/17: 257 lb 4.8 oz (116.7 kg).  Medication Reconciliation: complete    "

## 2017-11-29 ENCOUNTER — TELEPHONE (OUTPATIENT)
Dept: FAMILY MEDICINE | Facility: CLINIC | Age: 37
End: 2017-11-29

## 2017-11-29 NOTE — TELEPHONE ENCOUNTER
Panel Management Review      Patient has the following on his problem list:     Hypertension   Last three blood pressure readings:  BP Readings from Last 3 Encounters:   11/13/17 125/79   10/09/17 (!) 122/91   09/21/17 (!) 150/102     Blood pressure: FAILED    HTN Guidelines:  Age 18-59 BP range:  Less than 140/90  Age 60-85 with Diabetes:  Less than 140/90  Age 60-85 without Diabetes:  less than 150/90        Composite cancer screening  Chart review shows that this patient is due/due soon for the following None  Summary:    Patient is due/failing the following:   BP CHECK    Action needed:   Patient needs office visit for bp check with float nurse.    Type of outreach:    Phone, left message for patient to call back.     Questions for provider review:    None                                                                                                                                    Meredith Kan CMA      Chart routed to Care Team .

## 2017-11-29 NOTE — TELEPHONE ENCOUNTER
Patient calling was in to the sleep clinic on 11/13/17 and they took his blood pressure. Will this work? Or does he need to some in again. Please advise

## 2017-11-30 NOTE — TELEPHONE ENCOUNTER
"Called patient and left a voicemail, when he calls back please let him know unfortunately the blood pressure he got from sleep study doesn't \"count\" he needs to set up an appointment to see a float nurse here in the clinic.  Please apologize for this unconvienence.         Meredith Kan CMA     "

## 2017-12-04 ENCOUNTER — ALLIED HEALTH/NURSE VISIT (OUTPATIENT)
Dept: FAMILY MEDICINE | Facility: CLINIC | Age: 37
End: 2017-12-04
Payer: COMMERCIAL

## 2017-12-04 VITALS — DIASTOLIC BLOOD PRESSURE: 88 MMHG | SYSTOLIC BLOOD PRESSURE: 132 MMHG | HEART RATE: 80 BPM

## 2017-12-04 DIAGNOSIS — Z01.30 BLOOD PRESSURE CHECK: Primary | ICD-10-CM

## 2017-12-04 PROCEDURE — 99207 ZZC NO CHARGE NURSE ONLY: CPT

## 2017-12-04 NOTE — NURSING NOTE
Howie Yang is a 37 year old male who comes in today for a Blood Pressure check because of ongoing blood pressure monitoring.    *Document pulse and BP  *Use new set of vitals button for multiple readings.  *Use extended vitals for orthostatic    Vitals as recorded, a large cuff was used.    Patient is not taking medication as prescribed-stopped about 1 1/2 months ago after he was unable to cut tablets in half  Patient is not monitoring Blood Pressure at home.  Average readings if yes are     Current complaints: none    Disposition: results routed to MD/AP and patient reminded to call as needed  Marley Pulido, CMA

## 2017-12-04 NOTE — MR AVS SNAPSHOT
"              After Visit Summary   12/4/2017    Howie Yang    MRN: [id number]           Patient Information     Date Of Birth          1980        Visit Information        Provider Department      12/4/2017 2:00 PM NL FLOAT TEAM D [facility]        Today's Diagnoses     Blood pressure check    -  1       Follow-ups after your visit        Your next 10 appointments already scheduled     Jan 03, 2018  8:00 PM CST   PSG Split with PH BED 1   [facility] ([facility])    [address] [place] [address] [phone]            Jan 12, 2018  4:00 PM CST   Return Sleep Patient with Deshawn Cooney PA-C   [facility] ([facility])    [address] [place] [address] [phone]              Who to contact     If you have questions or need follow up information about today's clinic visit or your schedule please contact [facility] directly at [phone].  Normal or non-critical lab and imaging results will be communicated to you by Cytonicshart, letter or phone within 4 business days after the clinic has received the results. If you do not hear from us within 7 days, please contact the clinic through Nujit or phone. If you have a critical or abnormal lab result, we will notify you by phone as soon as possible.  Submit refill requests through TV Pixie or call your pharmacy and they will forward the refill request to us. Please allow 3 business days for your refill to be completed.          Additional Information About Your Visit        CytonicsharMetacloud Information     TV Pixie lets you send messages to your doctor, view your test results, renew your prescriptions, schedule appointments and more. To sign up, go to [email or web address] . Click on \"Log in\" on the left side of the screen, which will take you to the Welcome page. Then click on \"Sign up Now\" on the right " side of the page.     You will be asked to enter the access code listed below, as well as some personal information. Please follow the directions to create your username and password.     Your access code is: [email or web address]  Expires: 2017  7:31 PM     Your access code will  in 90 days. If you need help or a new code, please call your [facility] or [phone].        Care EveryWhere ID     This is your Care EveryWhere ID. This could be used by other organizations to access your [place] medical records  [id number]        Your Vitals Were     Pulse                   80            Blood Pressure from Last 3 Encounters:   17 132/88   17 125/79   10/09/17 (!) 122/91    Weight from Last 3 Encounters:   17 251 lb (113.9 kg)   17 257 lb 4.8 oz (116.7 kg)   17 275 lb (124.7 kg)              Today, you had the following     No orders found for display       Primary Care Provider Office Phone # Fax #    [facility] [phone] [address] [facility] [address]        Equal Access to Services     TRUE HALE AH: Hadii aad ku hadasho Soomaali, waaxda luqadaha, qaybta kaalmada adeegyada, waxay susanne hayrodrigon wendy graham laselvin koehler. So [facility] [phone].    ATENCIÓN: Si habla español, tiene a britton disposición servicios gratuitos de asistencia lingüística. [facility] [phone].    We comply with applicable federal civil rights laws and [place] laws. We do not discriminate on the basis of race, color, national origin, age, disability, sex, sexual orientation, or gender identity.            Thank you!     Thank you for choosing [facility]  for your care. Our goal is always to provide you with excellent care. Hearing back from our patients is one way we can continue to improve our services. Please take a few minutes to complete the written survey that you may receive in the mail after your visit with us. Thank you!             Your  Updated Medication List - Protect others around you: Learn how to safely use, store and throw away your medicines at www.disposemymeds.org.          This list is accurate as of: 12/4/17  2:24 PM.  Always use your most recent med list.                   Brand Name Dispense Instructions for use Diagnosis    chlorthalidone 25 MG tablet    HYGROTON    45 tablet    Take 0.5 tablets (12.5 mg) by mouth daily    Benign essential hypertension       IBUPROFEN PO      Take 600 mg by mouth

## 2018-01-03 ENCOUNTER — THERAPY VISIT (OUTPATIENT)
Dept: SLEEP MEDICINE | Facility: CLINIC | Age: 38
End: 2018-01-03
Payer: COMMERCIAL

## 2018-01-03 DIAGNOSIS — I10 BENIGN ESSENTIAL HYPERTENSION: ICD-10-CM

## 2018-01-03 DIAGNOSIS — G47.30 SLEEP APNEA, UNSPECIFIED TYPE: ICD-10-CM

## 2018-01-03 PROCEDURE — 95810 POLYSOM 6/> YRS 4/> PARAM: CPT | Performed by: OTOLARYNGOLOGY

## 2018-01-03 NOTE — MR AVS SNAPSHOT
"              After Visit Summary   1/3/2018    Howie Yang    MRN: [id number]           Patient Information     Date Of Birth          1980        Visit Information        Provider Department      1/3/2018 8:00 PM PH BED 1 [facility]        Today's Diagnoses     Sleep apnea, unspecified type        Benign essential hypertension           Follow-ups after your visit        Your next 10 appointments already scheduled     Jan 12, 2018  4:00 PM CST   Return Sleep Patient with Deshawn Cooney PA-C   [facility] ([facility])    [address] [place] [id number] [phone]              Who to contact     If you have questions or need follow up information about today's clinic visit or your schedule please contact [facility] directly at [phone].  Normal or non-critical lab and imaging results will be communicated to you by [employer] Pharm[employer] Developmenthart, letter or phone within 4 business days after the clinic has received the results. If you do not hear from us within 7 days, please contact the clinic through [employer] or phone. If you have a critical or abnormal lab result, we will notify you by phone as soon as possible.  Submit refill requests through Mobee or call your pharmacy and they will forward the refill request to us. Please allow 3 business days for your refill to be completed.          Additional Information About Your Visit        MyChart Information     Mobee lets you send messages to your doctor, view your test results, renew your prescriptions, schedule appointments and more. To sign up, go to [email or web address] . Click on \"Log in\" on the left side of the screen, which will take you to the Welcome page. Then click on \"Sign up Now\" on the right side of the page.     You will be asked to enter the access code listed below, as well as some personal information. Please follow the directions to create " your username and password.     Your access code is: [email or web address]  Expires: 2018  6:28 AM     Your access code will  in 90 days. If you need help or a new code, please call your [place] clinic or [phone].        Care EveryWhere ID     This is your Care EveryWhere ID. This could be used by other organizations to access your [place] medical records  [id number]         Blood Pressure from Last 3 Encounters:   17 132/88   17 125/79   10/09/17 (!) 122/91    Weight from Last 3 Encounters:   17 113.9 kg (251 lb)   17 116.7 kg (257 lb 4.8 oz)   17 124.7 kg (275 lb)              We Performed the Following     Comprehensive Sleep Study        Primary Care Provider Office Phone # Fax #    [facility] [phone]       [address] [facility] [address]        Equal Access to Services     TRUE HALE AH: Hadii aad ku hadasho Soomaali, waaxda luqadaha, qaybta kaalmada adeegyada, waxay idiin hayrodrigon wendy garcia . So [facility] [phone].    ATENCIÓN: Si habla español, tiene a britton disposición servicios gratuitos de asistencia lingüística. Llame al [phone].    We comply with applicable federal civil rights laws and [place] laws. We do not discriminate on the basis of race, color, national origin, age, disability, sex, sexual orientation, or gender identity.            Thank you!     Thank you for choosing [place] [facility]  for your care. Our goal is always to provide you with excellent care. Hearing back from our patients is one way we can continue to improve our services. Please take a few minutes to complete the written survey that you may receive in the mail after your visit with us. Thank you!             Your Updated Medication List - Protect others around you: Learn how to safely use, store and throw away your medicines at www.disposemymeds.org.          This list is accurate as of: 1/3/18 11:59 PM.  Always use your  most recent med list.                   Brand Name Dispense Instructions for use Diagnosis    chlorthalidone 25 MG tablet    HYGROTON    45 tablet    Take 0.5 tablets (12.5 mg) by mouth daily    Benign essential hypertension       IBUPROFEN PO      Take 600 mg by mouth

## 2018-01-15 ENCOUNTER — OFFICE VISIT (OUTPATIENT)
Dept: SLEEP MEDICINE | Facility: CLINIC | Age: 38
End: 2018-01-15
Payer: COMMERCIAL

## 2018-01-15 VITALS
RESPIRATION RATE: 18 BRPM | DIASTOLIC BLOOD PRESSURE: 73 MMHG | HEIGHT: 73 IN | HEART RATE: 91 BPM | OXYGEN SATURATION: 96 % | SYSTOLIC BLOOD PRESSURE: 139 MMHG | WEIGHT: 260 LBS | BODY MASS INDEX: 34.46 KG/M2

## 2018-01-15 DIAGNOSIS — G47.33 OSA (OBSTRUCTIVE SLEEP APNEA): Primary | ICD-10-CM

## 2018-01-15 PROCEDURE — 99214 OFFICE O/P EST MOD 30 MIN: CPT | Performed by: PHYSICIAN ASSISTANT

## 2018-01-15 NOTE — NURSING NOTE
"Chief Complaint   Patient presents with     Sleep Problem     psg results       Initial BP (!) 136/91  Pulse 91  Resp 18  Ht 1.854 m (6' 1\")  Wt 117.9 kg (260 lb)  SpO2 96%  BMI 34.3 kg/m2 Estimated body mass index is 34.3 kg/(m^2) as calculated from the following:    Height as of this encounter: 1.854 m (6' 1\").    Weight as of this encounter: 117.9 kg (260 lb).  Medication Reconciliation: complete    "

## 2018-01-15 NOTE — PROGRESS NOTES
Sleep Study Follow-Up Visit:    Date on this visit: 1/15/2018    Howie Yang comes in today for follow-up of his sleep study done on 1/3/2018 at the [facility] for excessive daytime sleepiness and possible sleep apnea.    Sleep latency 41.5 minutes without Ambien.  REM achieved.   REM latency 155.5 minutes.  Sleep efficiency 71.8%. Total sleep time 296.5 minutes.    Sleep architecture:  Stage 1, 18% (5%), stage 2, 53.3% (45-55%), stage 3, 10.6% (15-20%), stage REM, 18% (20-25%).  AHI was 29.5, with desaturations. RDI 39.4.  REM RDI 1.1, consistent with no REM AYANA.  Supine RDI -  Periodic Limb Movement Index 0.8/hour.       These findings were reviewed with patient.     Past medical/surgical history, family history, social history, medications and allergies were reviewed.      Problem List:  Patient Active Problem List    Diagnosis Date Noted     CARDIOVASCULAR SCREENING; LDL GOAL LESS THAN 160 10/31/2010     Priority: Medium        Impression/Plan:  modereately severe sleep apnea while in lateral position, resolves when prone. Will start aoto pap 5-15 CMH2O  He will follow up with me in about 4 week(s).     Twenty-five minutes spent with patient, all of which were spent face-to-face counseling, consulting, coordinating plan of care.      CC: Tian Badillo

## 2018-01-15 NOTE — PATIENT INSTRUCTIONS

## 2018-01-15 NOTE — MR AVS SNAPSHOT
After Visit Summary   1/15/2018    Howie Yang    MRN: [id number]           Patient Information     Date Of Birth          1980        Visit Information        Provider Department      1/15/2018 8:00 AM Deshawn Cooney PA-C [facility]        Today's Diagnoses     AYANA (obstructive sleep apnea)    -  1      Care Instructions      Your BMI is Body mass index is 34.3 kg/(m^2).  Weight management is a personal decision.  If you are interested in exploring weight loss strategies, the following discussion covers the approaches that may be successful. Body mass index (BMI) is one way to tell whether you are at a healthy weight, overweight, or obese. It measures your weight in relation to your height.  A BMI of 18.5 to 24.9 is in the healthy range. A person with a BMI of 25 to 29.9 is considered overweight, and someone with a BMI of 30 or greater is considered obese. More than two-thirds of American adults are considered overweight or obese.  Being overweight or obese increases the risk for further weight gain. Excess weight may lead to heart disease and diabetes.  Creating and following plans for healthy eating and physical activity may help you improve your health.  Weight control is part of healthy lifestyle and includes exercise, emotional health, and healthy eating habits. Careful eating habits lifelong are the mainstay of weight control. Though there are significant health benefits from weight loss, long-term weight loss with diet alone may be very difficult to achieve- studies show long-term success with dietary management in less than 10% of people. Attaining a healthy weight may be especially difficult to achieve in those with severe obesity. In some cases, medications, devices and surgical management might be considered.  What can you do?  If you are overweight or obese and are interested in methods for weight loss, you should discuss this with your provider.      Consider reducing daily calorie intake by 500 calories.     Keep a food journal.     Avoiding skipping meals, consider cutting portions instead.    Diet combined with exercise helps maintain muscle while optimizing fat loss. Strength training is particularly important for building and maintaining muscle mass. Exercise helps reduce stress, increase energy, and improves fitness. Increasing exercise without diet control, however, may not burn enough calories to loose weight.       Start walking three days a week 10-20 minutes at a time    Work towards walking thirty minutes five days a week     Eventually, increase the speed of your walking for 1-2 minutes at time    In addition, we recommend that you review healthy lifestyles and methods for weight loss available through the National Institutes of Health patient information sites:  http://win.niddk.nih.gov/publications/index.htm    And look into health and wellness programs that may be available through your health insurance provider, employer, local community center, or briana club.    Weight management plan: Patient was referred to their PCP to discuss a diet and exercise plan.              Follow-ups after your visit        Follow-up notes from your care team     Return in about 4 weeks (around 2/12/2018).      Your next 10 appointments already scheduled     Jan 17, 2018  8:30 AM CST   PAP SETUP with  [facility] ([facility])    [address] [place] [id number]   [phone]            Jan 25, 2018  5:20 PM CST   [facility] Visit with Tian Badillo [facility]   [facility]    [address] [place] [address]   [phone]           Bring a current list of meds and any records pertaining to this visit. For Physicals, please bring immunization records and any forms needing to be filled out. Please arrive 10 minutes early to complete  "paperwork.            Mar 02, 2018  8:00 AM CST   Return Sleep Patient with Deshawn Cooney PA-C   [facility]    [address] [place] [address] [phone]              Who to contact     If you have questions or need follow up information about today's clinic visit or your schedule please contact [facility] directly at [phone].  Normal or non-critical lab and imaging results will be communicated to you by MyChart, letter or phone within 4 business days after the clinic has received the results. If you do not hear from us within 7 days, please contact the clinic through StudentFunderhart or phone. If you have a critical or abnormal lab result, we will notify you by phone as soon as possible.  Submit refill requests through HeyBubble or call your pharmacy and they will forward the refill request to us. Please allow 3 business days for your refill to be completed.          Additional Information About Your Visit        [facility] Information     HeyBubble lets you send messages to your doctor, view your test results, renew your prescriptions, schedule appointments and more. To sign up, go to [email or web address] . Click on \"Log in\" on the left side of the screen, which will take you to the Welcome page. Then click on \"Sign up Now\" on the right side of the page.     You will be asked to enter the access code listed below, as well as some personal information. Please follow the directions to create your username and password.     Your access code is: [email or web address]  Expires: 2018  6:28 AM     Your access code will  in 90 days. If you need help or a new code, please call your [place] clinic or [phone].        Care EveryWhere ID     This is your Care EveryWhere ID. This could be used by other organizations to access your [place] medical records  [id number]        Your Vitals Were     Pulse Respirations Height " "Pulse Oximetry BMI (Body Mass Index)       91 18 1.854 m (6' 1\") 96% 34.3 kg/m2        Blood Pressure from Last 3 Encounters:   01/15/18 139/73   12/04/17 132/88   11/13/17 125/79    Weight from Last 3 Encounters:   01/15/18 117.9 kg (260 lb)   11/13/17 113.9 kg (251 lb)   09/21/17 116.7 kg (257 lb 4.8 oz)              We Performed the Following     Comprehensive DME        Primary Care Provider Office Phone # Fax #    Tian Badillo [phone] [address] [place] [address]        Equal Access to Services     TRUE HALE : Fausto Henderson, destiny thakkar, eva kaalmatimothy zambrano, rishi koehler. So [facility] [phone].    ATENCIÓN: Si habla español, tiene a britton disposición servicios gratuitos de asistencia lingüística. Llame al [phone].    We comply with applicable federal civil rights laws and [place] laws. We do not discriminate on the basis of race, color, national origin, age, disability, sex, sexual orientation, or gender identity.            Thank you!     Thank you for choosing [facility] [facility]  for your care. Our goal is always to provide you with excellent care. Hearing back from our patients is one way we can continue to improve our services. Please take a few minutes to complete the written survey that you may receive in the mail after your visit with us. Thank you!             Your Updated Medication List - Protect others around you: Learn how to safely use, store and throw away your medicines at www.disposemymeds.org.          This list is accurate as of: 1/15/18 10:42 AM.  Always use your most recent med list.                   Brand Name Dispense Instructions for use Diagnosis    chlorthalidone 25 MG tablet    HYGROTON    45 tablet    Take 0.5 tablets (12.5 mg) by mouth daily    Benign essential hypertension       IBUPROFEN PO      Take 600 mg by mouth          "

## 2018-01-17 ENCOUNTER — DOCUMENTATION ONLY (OUTPATIENT)
Dept: SLEEP MEDICINE | Facility: CLINIC | Age: 38
End: 2018-01-17
Payer: COMMERCIAL

## 2018-01-17 DIAGNOSIS — G47.33 OSA (OBSTRUCTIVE SLEEP APNEA): Primary | ICD-10-CM

## 2018-01-17 NOTE — PROGRESS NOTES
Patient was offered choice of vendor and chose [facility].  Patient Howie Yang was set up at [place] on January 17, 2018. Patient received a Resmed AirSense 10 Auto. Pressures were set at 5-15 cm H2O.   Patient s ramp is 5 cm H2O for Off and FLEX/EPR is EPR.  Patient received a Resmed Mask name: AIRFIT F20  Full Face mask Size Medium, heated tubing and heated humidifier.  Patient is enrolled in the STM Program and does need to meet compliance. Patient has a follow up on 3/2/18 with Deshawn Cooney.    Meredith Sánchez

## 2018-01-22 ENCOUNTER — DOCUMENTATION ONLY (OUTPATIENT)
Dept: SLEEP MEDICINE | Facility: CLINIC | Age: 38
End: 2018-01-22
Payer: COMMERCIAL

## 2018-01-22 NOTE — PROGRESS NOTES
3 DAY STM VISIT    Patient contacted for 3 day STM visit  Message left for patient to return call     Device type: Auto-CPAP  PAP settings: CPAP min 5 cm  H20     CPAP max 15 cm  H20       Assessment: Nightly usage over four hours.  Action plan: Pt to have f/u 14 day STM visit.  Diagnostic AHI: 29.5

## 2018-01-25 ENCOUNTER — OFFICE VISIT (OUTPATIENT)
Dept: FAMILY MEDICINE | Facility: CLINIC | Age: 38
End: 2018-01-25
Payer: COMMERCIAL

## 2018-01-25 VITALS
TEMPERATURE: 98 F | HEART RATE: 121 BPM | DIASTOLIC BLOOD PRESSURE: 84 MMHG | SYSTOLIC BLOOD PRESSURE: 139 MMHG | OXYGEN SATURATION: 97 % | BODY MASS INDEX: 36.4 KG/M2 | WEIGHT: 275.9 LBS

## 2018-01-25 DIAGNOSIS — M25.561 ACUTE PAIN OF RIGHT KNEE: ICD-10-CM

## 2018-01-25 DIAGNOSIS — Z13.6 CARDIOVASCULAR SCREENING; LDL GOAL LESS THAN 160: Primary | ICD-10-CM

## 2018-01-25 LAB
CHOLEST SERPL-MCNC: 130 MG/DL
HDLC SERPL-MCNC: 47 MG/DL
LDLC SERPL CALC-MCNC: 59 MG/DL
NONHDLC SERPL-MCNC: 83 MG/DL
TRIGL SERPL-MCNC: 119 MG/DL

## 2018-01-25 PROCEDURE — 36415 COLL VENOUS BLD VENIPUNCTURE: CPT | Performed by: FAMILY MEDICINE

## 2018-01-25 PROCEDURE — 99213 OFFICE O/P EST LOW 20 MIN: CPT | Performed by: FAMILY MEDICINE

## 2018-01-25 PROCEDURE — 80061 LIPID PANEL: CPT | Performed by: FAMILY MEDICINE

## 2018-01-25 ASSESSMENT — PAIN SCALES - GENERAL: PAINLEVEL: NO PAIN (0)

## 2018-01-25 NOTE — NURSING NOTE
"Chief Complaint   Patient presents with     Knee Pain       Initial /84 (BP Location: Left arm, Patient Position: Chair, Cuff Size: Adult Regular)  Pulse 121  Temp 98  F (36.7  C) (Temporal)  Wt 275 lb 14.4 oz (125.1 kg)  SpO2 97%  BMI 36.4 kg/m2 Estimated body mass index is 36.4 kg/(m^2) as calculated from the following:    Height as of 1/15/18: 6' 1\" (1.854 m).    Weight as of this encounter: 275 lb 14.4 oz (125.1 kg).  Medication Reconciliation: complete   Meredith Kan CMA    Health Maintenance Due   Topic Date Due     LIPID SCREEN Q5 YR MALE (SYSTEM ASSIGNED)  12/11/2017       Health Maintenance reviewed at today's visit patient asked to schedule/complete:   Patient is aware.       "

## 2018-01-25 NOTE — LETTER
January 26, 2018      Howie Yang  [address] [address] [address]        Dear ,    We are writing to inform you of your test results.    Your cholesterol numbers look great.    Resulted Orders   Lipid panel reflex to direct LDL Fasting   Result Value Ref Range    Cholesterol 130 <200 mg/dL    Triglycerides 119 <150 mg/dL      Comment:      Non Fasting    HDL Cholesterol 47 >39 mg/dL    LDL Cholesterol Calculated 59 <100 mg/dL      Comment:      Desirable:       <100 mg/dl    Non HDL Cholesterol 83 <130 mg/dL       If you have any questions or concerns, please call the clinic at the number listed above.       Sincerely,    Tian Badillo MD

## 2018-01-25 NOTE — PROGRESS NOTES
SUBJECTIVE:                                                    Howie Yang is a 38 year old male who presents to clinic today for the following health issues:    Chief Complaint   Patient presents with     Knee Pain        Location:right knee  Duration of pain- couple years  Intensity- Currently 0/10, Worst 8/10  Radiating- up into the hip and down into the foot sometimes  What relives the pain-n/a  Cause-unknown, states that it was getting worse when he was playing softball.      Comes in complaining intermittent right knee pain.  When is trying to get into a car and his foot strikes the door jam he feels a sudden pain in his lateral aspect.  Then usually goes away after few hours.      ROS:      OBJECTIVE:                                                    /84 (BP Location: Left arm, Patient Position: Chair, Cuff Size: Adult Regular)  Pulse 121  Temp 98  F (36.7  C) (Temporal)  Wt 275 lb 14.4 oz (125.1 kg)  SpO2 97%  BMI 36.4 kg/m2  Body mass index is 36.4 kg/(m^2).    Well-appearing male no distress.  His left knee appears normal.  He has no tenderness throughout.  No effusion or redness.  Normal ligamentous exam.  Normal range of motion.  The area he points to his on the lateral aspect    Diagnostic Test Results:  none      ASSESSMENT/PLAN:                                                        ICD-10-CM    1. CARDIOVASCULAR SCREENING; LDL GOAL LESS THAN 160 Z13.6 Lipid panel reflex to direct LDL Fasting   2. Acute pain of right knee M25.561        He could have a chronic lateral collateral ligament sprain, or IT band issue.  Mainly reassurance given.  I gave him some stretches as well.    Tian Badillo MD  [facility]

## 2018-01-25 NOTE — LETTER
[address] [address]  [phone]        January 26, 2018    Howie Yang  [address] [address] [place] [address]          Dear Howie,    Here is some exercises Dr. Badillo recommends.  Thank you.    Sincerely,        Dr. Badillo's Care Team

## 2018-01-25 NOTE — MR AVS SNAPSHOT
"              After Visit Summary   1/25/2018    Howie Yang    MRN: [id number]           Patient Information     Date Of Birth          1980        Visit Information        Provider Department      1/25/2018 5:20 PM Tian Badillo MD [facility]        Today's Diagnoses     CARDIOVASCULAR SCREENING; LDL GOAL LESS THAN 160    -  1    Acute pain of right knee           Follow-ups after your visit        Your next 10 appointments already scheduled     Mar 02, 2018  8:00 AM CST   Return Sleep Patient with Deshawn Cooney PA-C   [facility] [place] [place]    [address] [place] [address] [id number] [phone]              Who to contact     If you have questions or need follow up information about today's clinic visit or your schedule please contact [facility] directly at [phone].  Normal or non-critical lab and imaging results will be communicated to you by MyChart, letter or phone within 4 business days after the clinic has received the results. If you do not hear from us within 7 days, please contact the clinic through [employer] or phone. If you have a critical or abnormal lab result, we will notify you by phone as soon as possible.  Submit refill requests through [employer] or call your pharmacy and they will forward the refill request to us. Please allow 3 business days for your refill to be completed.          Additional Information About Your Visit        MyChart Information     [employer] lets you send messages to your doctor, view your test results, renew your prescriptions, schedule appointments and more. To sign up, go to [email or web address] . Click on \"Log in\" on the left side of the screen, which will take you to the Welcome page. Then click on \"Sign up Now\" on the right side of the page.     You will be asked to enter the access code listed below, as well as some personal information. Please follow the " directions to create your username and password.     Your access code is: [email or web address]  Expires: 2018  6:28 AM     Your access code will  in 90 days. If you need help or a new code, please call your [place] clinic or [phone].        Care EveryWhere ID     This is your Care EveryWhere ID. This could be used by other organizations to access your [place] medical records  [id number]        Your Vitals Were     Pulse Temperature Pulse Oximetry BMI (Body Mass Index)          121 98  F (36.7  C) (Temporal) 97% 36.4 kg/m2         Blood Pressure from Last 3 Encounters:   18 139/84   01/15/18 139/73   17 132/88    Weight from Last 3 Encounters:   18 275 lb 14.4 oz (125.1 kg)   01/15/18 260 lb (117.9 kg)   17 251 lb (113.9 kg)              We Performed the Following     Lipid panel reflex to direct LDL Fasting        Primary Care Provider Office Phone # Fax #    Tian Badillo [phone] [address] [place] [address]        Equal Access to Services     KE [place] AH: Hadii jose guadalupe stovall hadalixo Soomaali, waaxda luqadaha, qaybta kaalmada adeegyada, rishi koehler. So [facility] [phone].    ATENCIÓN: Si habla español, tiene a britton disposición servicios gratuitos de asistencia lingüística. Llame al [phone].    We comply with applicable federal civil rights laws and [place] laws. We do not discriminate on the basis of race, color, national origin, age, disability, sex, sexual orientation, or gender identity.            Thank you!     Thank you for choosing [facility]  for your care. Our goal is always to provide you with excellent care. Hearing back from our patients is one way we can continue to improve our services. Please take a few minutes to complete the written survey that you may receive in the mail after your visit with us. Thank you!             Your Updated Medication List - Protect others around you:  Learn how to safely use, store and throw away your medicines at www.disposemymeds.org.          This list is accurate as of 1/25/18 11:59 PM.  Always use your most recent med list.                   Brand Name Dispense Instructions for use Diagnosis    chlorthalidone 25 MG tablet    HYGROTON    45 tablet    Take 0.5 tablets (12.5 mg) by mouth daily    Benign essential hypertension       IBUPROFEN PO      Take 600 mg by mouth        order for DME      Equipment ordered: RESMED Auto PAP Mask type: Full face  Settings: 5-15 CM H2O

## 2018-02-01 ENCOUNTER — DOCUMENTATION ONLY (OUTPATIENT)
Dept: SLEEP MEDICINE | Facility: CLINIC | Age: 38
End: 2018-02-01
Payer: COMMERCIAL

## 2018-02-01 NOTE — PROGRESS NOTES
14 DAY STM VISIT    Subjective measures:   Patient stated things are going good sleeping well but his young son keeps him up at night.     Assessment: Pt meeting objective benchmarks.  Patient meeting subjective benchmarks.   Action plan: Pt to have 30 day STM visit.    Device type: Auto-CPAP  PAP settings:  CPAP min 5 cm  H20      CPAP max 15 cm  H20     95th% pressure 11.8 cm     Objective measures: 14 day rolling measures      Compliance  100 %      Leak  13.71 lpm  last  upload      AHI 0.91   last  upload      Average number of minutes 404    Diagnostic AHI: 29.5     Objective measure goal  Compliance   Goal >70%  Leak   Goal < 24 lpm  AHI  Goal < 5  Usage  Goal >240

## 2018-02-19 ENCOUNTER — DOCUMENTATION ONLY (OUTPATIENT)
Dept: SLEEP MEDICINE | Facility: CLINIC | Age: 38
End: 2018-02-19
Payer: COMMERCIAL

## 2018-02-19 NOTE — PROGRESS NOTES
30 DAY STM VISIT    Unable to leave message.     Assessment: Pt meeting objective benchmarks.     Action plan: pt to have 6 month STM visit.  Patient has a follow up visit with Deshawn Cooney on 3/2/2018.   Device type: Auto-CPAP  PAP settings: CPAP min 5 cm  H20     CPAP max 15 cm  H20    95th% pressure 13.6 cm  H20   Objective measures: 14 day rolling measures      Compliance  100 %      Leak  17.44 lpm  last  upload      AHI 1.49   last  upload      Average number of minutes 415    Diagnostic AHI: 29.5         Objective measure goal  Compliance   Goal >70%  Leak   Goal < 24 lpm  AHI  Goal < 5  Usage  Goal >240

## 2018-02-20 NOTE — PROGRESS NOTES
Patient called back and stated everything is going well with his CPAP and he had no questions or concerns.

## 2018-02-26 ENCOUNTER — OFFICE VISIT (OUTPATIENT)
Dept: PSYCHOLOGY | Facility: CLINIC | Age: 38
End: 2018-02-26
Payer: COMMERCIAL

## 2018-02-26 DIAGNOSIS — F43.23 ADJUSTMENT DISORDER WITH MIXED ANXIETY AND DEPRESSED MOOD: Primary | ICD-10-CM

## 2018-02-26 PROCEDURE — 90834 PSYTX W PT 45 MINUTES: CPT | Performed by: MARRIAGE & FAMILY THERAPIST

## 2018-02-26 ASSESSMENT — ANXIETY QUESTIONNAIRES
7. FEELING AFRAID AS IF SOMETHING AWFUL MIGHT HAPPEN: NOT AT ALL
GAD7 TOTAL SCORE: 0
IF YOU CHECKED OFF ANY PROBLEMS ON THIS QUESTIONNAIRE, HOW DIFFICULT HAVE THESE PROBLEMS MADE IT FOR YOU TO DO YOUR WORK, TAKE CARE OF THINGS AT HOME, OR GET ALONG WITH OTHER PEOPLE: NOT DIFFICULT AT ALL
5. BEING SO RESTLESS THAT IT IS HARD TO SIT STILL: NOT AT ALL
2. NOT BEING ABLE TO STOP OR CONTROL WORRYING: NOT AT ALL
3. WORRYING TOO MUCH ABOUT DIFFERENT THINGS: NOT AT ALL
6. BECOMING EASILY ANNOYED OR IRRITABLE: NOT AT ALL
1. FEELING NERVOUS, ANXIOUS, OR ON EDGE: NOT AT ALL

## 2018-02-26 ASSESSMENT — PATIENT HEALTH QUESTIONNAIRE - PHQ9: 5. POOR APPETITE OR OVEREATING: NOT AT ALL

## 2018-02-26 NOTE — PROGRESS NOTES
Progress Note - Initial Session    Client Name:  Howie Yang Date: 2/26/18         Service Type: Individual      Session Start Time: 2:05 pm  Session End Time: 2:57 pm      Session Length: 38 - 52      Session #: 1     Attendees: Client attended alone         Diagnostic Assessment in progress.      Mental Status Assessment:  Appearance:   Disheveled   Eye Contact:   Fair   Psychomotor Behavior: Normal   Attitude:   Cooperative   Orientation:   All  Speech   Rate / Production: Normal    Volume:  Normal   Mood:    Normal  Affect:    Appropriate   Thought Content:  Clear   Thought Form:  Coherent  Logical   Insight:    Good       Safety Issues and Plan for Safety and Risk Management:  Client denies current fears or concerns for personal safety.  Client denies current or recent suicidal ideation or behaviors.  Client denies current or recent homicidal ideation or behaviors.  Client denies current or recent self injurious behavior or ideation.  Client denies other safety concerns.  A safety and risk management plan has not been developed at this time, however client was given the after-hours number / 911 should there be a change in any of these risk factors.  Client reports there are firearms in the house. The firearms are secured in a locked space.      Diagnostic Criteria:  A. The development of emotional or behavioral symptoms in response to an identifiable stressor(s) occurring within 3 months of the onset of the stressor(s)  B. These symptoms or behaviors are clinically significant, as evidenced by one or both of the following:       - Marked distress that is out of proportion to the severity/intensity of the stressor (with consideration for external context & culture)       - Significant impairment in social, occupational, or other important areas of functioning  C. The stress-related disturbance does not meet criteria for another disorder & is not not an exacerbation of another mental  disorder  D. The symptoms do not represent normal bereavement  E. 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     WHODAS 2.0 (12 item)           Not complete yet    Collateral Reports Completed:  Not Applicable      PLAN: (Homework, other):  Client stated that he may follow up for ongoing services with [facility].        ADA Hennessy

## 2018-02-26 NOTE — MR AVS SNAPSHOT
"                  MRN:[id number]                      After Visit Summary   2/26/2018    Howie Yang    MRN: [id number]           Visit Information        Provider Department      2/26/2018 2:00 PM Renaldo Cintron [facility]      Your next 10 appointments already scheduled     Mar 02, 2018  8:00 AM CST   Return Sleep Patient with Deshawn Cooney PA-C   [facility] [address] [address]   [phone]            Mar 06, 2018  8:00 AM CST   Return Visit with Renaldo Cintron [address] [address]   [phone]            Mar 13, 2018  8:00 AM CDT   Return Visit with Renaldo Cintron [address] [address]   [phone]            Mar 20, 2018  8:00 AM CDT   Return Visit with Renaldo Cintron [address] [address]   [phone]            Mar 27, 2018  8:00 AM CDT   Return Visit with Renaldo Cintron [address] [address]   [phone]            Apr 04, 2018  7:00 AM CDT   Return Visit with Renaldo Cintron [address] [address]   [phone]              [employer] Information     [employer] lets you send messages to your doctor, view your test results, renew your prescriptions, schedule appointments and more. To sign up, go to [email or web address] . Click on \"Log in\" on the left side of the screen, which will take you to the Welcome page. Then click on \"Sign up " "Now\" on the right side of the page.     You will be asked to enter the access code listed below, as well as some personal information. Please follow the directions to create your username and password.     Your access code is: [email or web address]  Expires: 2018  6:28 AM     Your access code will  in 90 days. If you need help or a new code, please call your [place] clinic or [phone].        Care EveryWhere ID     This is your Care EveryWhere ID. This could be used by other organizations to access your [place] medical records  [id number]        Equal Access to Services     TRUE HALE : Fausto Henderson, destiny thakkar, eva zambrano, rishi koehler. So [place] [phone].    ATENCIÓN: Si habla español, tiene a britton disposición servicios gratuitos de asistencia lingüística. Shad al [phone].    We comply with applicable federal civil rights laws and [place] laws. We do not discriminate on the basis of race, color, national origin, age, disability, sex, sexual orientation, or gender identity.            "

## 2018-02-27 ASSESSMENT — PATIENT HEALTH QUESTIONNAIRE - PHQ9: SUM OF ALL RESPONSES TO PHQ QUESTIONS 1-9: 2

## 2018-02-27 ASSESSMENT — ANXIETY QUESTIONNAIRES: GAD7 TOTAL SCORE: 0

## 2018-03-05 ENCOUNTER — OFFICE VISIT (OUTPATIENT)
Dept: FAMILY MEDICINE | Facility: OTHER | Age: 38
End: 2018-03-05
Payer: COMMERCIAL

## 2018-03-05 ENCOUNTER — RADIANT APPOINTMENT (OUTPATIENT)
Dept: GENERAL RADIOLOGY | Facility: OTHER | Age: 38
End: 2018-03-05
Attending: PHYSICIAN ASSISTANT
Payer: COMMERCIAL

## 2018-03-05 VITALS
DIASTOLIC BLOOD PRESSURE: 84 MMHG | HEART RATE: 110 BPM | SYSTOLIC BLOOD PRESSURE: 130 MMHG | TEMPERATURE: 101.3 F | BODY MASS INDEX: 36.07 KG/M2 | WEIGHT: 266.3 LBS | OXYGEN SATURATION: 94 % | RESPIRATION RATE: 20 BRPM | HEIGHT: 72 IN

## 2018-03-05 DIAGNOSIS — R09.89 ABNORMAL CHEST SOUNDS: ICD-10-CM

## 2018-03-05 DIAGNOSIS — R50.9 FEVER, UNSPECIFIED FEVER CAUSE: ICD-10-CM

## 2018-03-05 DIAGNOSIS — R05.9 COUGH: Primary | ICD-10-CM

## 2018-03-05 DIAGNOSIS — R05.9 COUGH: ICD-10-CM

## 2018-03-05 DIAGNOSIS — J10.1 INFLUENZA B: ICD-10-CM

## 2018-03-05 LAB
BASOPHILS # BLD AUTO: 0 10E9/L (ref 0–0.2)
BASOPHILS NFR BLD AUTO: 0 %
DIFFERENTIAL METHOD BLD: ABNORMAL
EOSINOPHIL # BLD AUTO: 0.1 10E9/L (ref 0–0.7)
EOSINOPHIL NFR BLD AUTO: 0.9 %
ERYTHROCYTE [DISTWIDTH] IN BLOOD BY AUTOMATED COUNT: 13.2 % (ref 10–15)
FLUAV+FLUBV AG SPEC QL: NEGATIVE
FLUAV+FLUBV AG SPEC QL: POSITIVE
HCT VFR BLD AUTO: 51.1 % (ref 40–53)
HGB BLD-MCNC: 17.9 G/DL (ref 13.3–17.7)
LYMPHOCYTES # BLD AUTO: 1.1 10E9/L (ref 0.8–5.3)
LYMPHOCYTES NFR BLD AUTO: 20.4 %
MCH RBC QN AUTO: 31.5 PG (ref 26.5–33)
MCHC RBC AUTO-ENTMCNC: 35 G/DL (ref 31.5–36.5)
MCV RBC AUTO: 90 FL (ref 78–100)
MONOCYTES # BLD AUTO: 0.8 10E9/L (ref 0–1.3)
MONOCYTES NFR BLD AUTO: 14.4 %
NEUTROPHILS # BLD AUTO: 3.5 10E9/L (ref 1.6–8.3)
NEUTROPHILS NFR BLD AUTO: 64.3 %
PLATELET # BLD AUTO: 162 10E9/L (ref 150–450)
RBC # BLD AUTO: 5.68 10E12/L (ref 4.4–5.9)
SPECIMEN SOURCE: ABNORMAL
WBC # BLD AUTO: 5.5 10E9/L (ref 4–11)

## 2018-03-05 PROCEDURE — 36415 COLL VENOUS BLD VENIPUNCTURE: CPT | Performed by: PHYSICIAN ASSISTANT

## 2018-03-05 PROCEDURE — 99213 OFFICE O/P EST LOW 20 MIN: CPT | Performed by: PHYSICIAN ASSISTANT

## 2018-03-05 PROCEDURE — 87804 INFLUENZA ASSAY W/OPTIC: CPT | Performed by: PHYSICIAN ASSISTANT

## 2018-03-05 PROCEDURE — 71046 X-RAY EXAM CHEST 2 VIEWS: CPT | Mod: FY

## 2018-03-05 PROCEDURE — 85025 COMPLETE CBC W/AUTO DIFF WBC: CPT | Performed by: PHYSICIAN ASSISTANT

## 2018-03-05 RX ORDER — CODEINE PHOSPHATE AND GUAIFENESIN 10; 100 MG/5ML; MG/5ML
1 SOLUTION ORAL EVERY 4 HOURS PRN
Qty: 240 ML | Refills: 0 | Status: SHIPPED | OUTPATIENT
Start: 2018-03-05 | End: 2020-06-19

## 2018-03-05 ASSESSMENT — PAIN SCALES - GENERAL: PAINLEVEL: SEVERE PAIN (6)

## 2018-03-05 NOTE — PROGRESS NOTES
SUBJECTIVE:   Howie Yang is a 38 year old male who presents to clinic today for the following health issues:      HPI  Acute Illness   Acute illness concerns: Cough   Onset: 03/01    Fever: YES    Chills/Sweats: YES- Chills    Headache (location?): YES- Behind eyes    Sinus Pressure:YES- reddened, post-nasal drainage and facial pain    Conjunctivitis:  no    Ear Pain: YES: bilateral    Rhinorrhea: YES    Congestion: YES    Sore Throat: no     Cough: YES-non-productive- Feels like a balloon in chest    Wheeze: no    Decreased Appetite: YES    Nausea: no    Vomiting: no    Diarrhea:  YES- One time    Dysuria/Freq.: no    Fatigue/Achiness: YES    Sick/Strep Exposure: YES     Therapies Tried and outcome: Dayquil, Nyquil, Mucinex.    Problem list and histories reviewed & adjusted, as indicated.  Additional history: as documented    Patient Active Problem List   Diagnosis     CARDIOVASCULAR SCREENING; LDL GOAL LESS THAN 160     Past Surgical History:   Procedure Laterality Date     VASECTOMY  2/4/2016       Social History   Substance Use Topics     Smoking status: Former Smoker     Smokeless tobacco: Former User     Types: Snuff     Alcohol use No     Family History   Problem Relation Age of Onset     Hypertension Father      Hypertension Brother          Current Outpatient Prescriptions   Medication Sig Dispense Refill     order for DME Equipment ordered: RESMED Auto PAP Mask type: Full face  Settings: 5-15 CM H2O       IBUPROFEN PO Take 600 mg by mouth       Allergies   Allergen Reactions     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    OBJECTIVE:     /84 (Cuff Size: Adult Large)  Pulse 110  Temp 101.3  F (38.5  C) (Oral)  Resp 20  Ht 6' 0.13\" (1.832 m)  Wt 266 lb 4.8 oz (120.8 kg)  SpO2 94%  BMI 35.99 kg/m2  Body mass index is 35.99 kg/(m^2).  GENERAL: healthy, alert and no distress  HENT: ear canals and TM's normal, nose and mouth without ulcers or lesions  NECK: no adenopathy, no asymmetry, masses, or scars and trachea midline and normal to palpation  RESP: rales on the right with crackles that mostly clear with congested cough  CV: regular rate and rhythm, normal S1 S2, no S3 or S4, no murmur, click or rub, no peripheral edema and peripheral pulses strong  MS: no gross musculoskeletal defects noted, no edema  NEURO: Normal strength and tone, mentation intact and speech normal  PSYCH: affect flat, fatigued and notes that he \"feels foggy\"    Diagnostic Test Results:  Results for orders placed or performed in visit on 03/05/18 (from the past 24 hour(s))   Influenza A/B antigen   Result Value Ref Range    Influenza A/B Agn Specimen Nasal     Influenza A Negative NEG^Negative    Influenza B Positive (A) NEG^Negative     CBC final read is pending.  Appeared to be viral in nature.    ASSESSMENT/PLAN:     1. Cough  2. Abnormal chest sounds  3. Fever, unspecified fever cause  4. Influenza B  The overall timeframe of his illness seems to be somewhere between 72 and 96 hours.  Advised that Tamiflu probably will not work for him at this point in time.  Advised plenty of rest fluids and medications as noted below to try to get some rest related to the cough.  He is to follow-up as needed.  - Influenza A/B antigen  - CBC with platelets and differential  - XR Chest 2 Views; Future  - guaiFENesin-codeine (ROBITUSSIN AC) 100-10 MG/5ML SOLN solution; Take 5 mLs by mouth every 4 hours as needed for cough  Dispense: " 240 mL; Refill: 0    Zoran Winters PA-C  [facility]

## 2018-03-05 NOTE — NURSING NOTE
"Chief Complaint   Patient presents with     URI       Initial /84 (Cuff Size: Adult Large)  Pulse 110  Temp 101.3  F (38.5  C) (Oral)  Resp 20  Ht 6' 0.13\" (1.832 m)  Wt 266 lb 4.8 oz (120.8 kg)  SpO2 94%  BMI 35.99 kg/m2 Estimated body mass index is 35.99 kg/(m^2) as calculated from the following:    Height as of this encounter: 6' 0.13\" (1.832 m).    Weight as of this encounter: 266 lb 4.8 oz (120.8 kg).  Medication Reconciliation: complete  "

## 2018-03-05 NOTE — MR AVS SNAPSHOT
After Visit Summary   3/5/2018    Howie Yang    MRN: [id number]           Patient Information     Date Of Birth          1980        Visit Information        Provider Department      3/5/2018 3:40 PM Zoran Ayala PA-C [facility]        Today's Diagnoses     Cough    -  1    Abnormal chest sounds        Fever, unspecified fever cause        Influenza B           Follow-ups after your visit        Your next 10 appointments already scheduled     Mar 06, 2018  8:00 AM CST   Return Visit with Renaldo Cintron [address] [address]   [phone]            Mar 13, 2018  8:00 AM CDT   Return Visit with Renaldo Cintron [address] [address]   [phone]            Mar 20, 2018  8:00 AM CDT   Return Visit with Renaldo Cintron [address] [address]   [phone]            Mar 27, 2018  8:00 AM CDT   Return Visit with Renaldo Cintron [address] [address]   [phone]            Apr 04, 2018  7:00 AM CDT   Return Visit with Renaldo Cintron [address] [address]   [phone]              Who to contact     If you have questions or need follow up information about today's clinic visit or your schedule please contact [facility] directly at [phone].  Normal or non-critical lab and imaging results will be communicated to you by MyChart, letter or phone within 4 business days after the clinic has received the results. If you do not hear from us within 7 days,  "please contact the clinic through [employer] or phone. If you have a critical or abnormal lab result, we will notify you by phone as soon as possible.  Submit refill requests through [employer] or call your pharmacy and they will forward the refill request to us. Please allow 3 business days for your refill to be completed.          Additional Information About Your Visit        [employer] Information     [employer] lets you send messages to your doctor, view your test results, renew your prescriptions, schedule appointments and more. To sign up, go to [email or web address] . Click on \"Log in\" on the left side of the screen, which will take you to the Welcome page. Then click on \"Sign up Now\" on the right side of the page.     You will be asked to enter the access code listed below, as well as some personal information. Please follow the directions to create your username and password.     Your access code is: [email or web address]  Expires: 2018  6:28 AM     Your access code will  in 90 days. If you need help or a new code, please call your [place] clinic or [phone].        Care EveryWhere ID     This is your Care EveryWhere ID. This could be used by other organizations to access your [place] medical records  [id number]        Your Vitals Were     Pulse Temperature Respirations Height Pulse Oximetry BMI (Body Mass Index)    110 101.3  F (38.5  C) (Oral) 20 6' 0.13\" (1.832 m) 94% 35.99 kg/m2       Blood Pressure from Last 3 Encounters:   18 130/84   18 139/84   01/15/18 139/73    Weight from Last 3 Encounters:   18 266 lb 4.8 oz (120.8 kg)   18 275 lb 14.4 oz (125.1 kg)   01/15/18 260 lb (117.9 kg)              We Performed the Following     CBC with platelets and differential     Influenza A/B antigen          Today's Medication Changes          These changes are accurate as of 3/5/18  4:18 PM.  If you have any questions, ask your nurse or doctor.               Start taking these medicines.  "       Dose/Directions    guaiFENesin-codeine 100-10 MG/5ML Soln solution   Commonly known as:  ROBITUSSIN AC   Used for:  Cough, Abnormal chest sounds, Fever, unspecified fever cause, Influenza B   Started by:  Zoran Ayala PA-C        Dose:  1 tsp.   Take 5 mLs by mouth every 4 hours as needed for cough   Quantity:  240 mL   Refills:  0         Stop taking these medicines if you haven't already. Please contact your care team if you have questions.     chlorthalidone 25 MG tablet   Commonly known as:  HYGROTON   Stopped by:  Zoran Ayala PA-C                Where to get your medicines      Some of these will need a paper prescription and others can be bought over the counter.  Ask your nurse if you have questions.     Bring a paper prescription for each of these medications     guaiFENesin-codeine 100-10 MG/5ML Soln solution                Primary Care Provider Office Phone # Fax #    Tian Badillo [phone] [address] [place] [address]        Equal Access to Services     [facility] AH: Hadii aad ku hadasho Soomaali, waaxda luqadaha, qaybta kaalmada adeegyada, waxay idiin hayaan wendy garcia . So [facility] [phone].    ATENCIÓN: Si habla español, tiene a britton disposición servicios gratuitos de asistencia lingüística. [facility] [phone].    We comply with applicable federal civil rights laws and [place] laws. We do not discriminate on the basis of race, color, national origin, age, disability, sex, sexual orientation, or gender identity.            Thank you!     Thank you for choosing [facility]  for your care. Our goal is always to provide you with excellent care. Hearing back from our patients is one way we can continue to improve our services. Please take a few minutes to complete the written survey that you may receive in the mail after your visit with us. Thank you!             Your Updated Medication List - Protect others around you: Learn  how to safely use, store and throw away your medicines at www.disposemymeds.org.          This list is accurate as of 3/5/18  4:18 PM.  Always use your most recent med list.                   Brand Name Dispense Instructions for use Diagnosis    guaiFENesin-codeine 100-10 MG/5ML Soln solution    ROBITUSSIN AC    240 mL    Take 5 mLs by mouth every 4 hours as needed for cough    Cough, Abnormal chest sounds, Fever, unspecified fever cause, Influenza B       IBUPROFEN PO      Take 600 mg by mouth        order for DME      Equipment ordered: RESMED Auto PAP Mask type: Full face  Settings: 5-15 CM H2O

## 2018-03-13 ENCOUNTER — OFFICE VISIT (OUTPATIENT)
Dept: PSYCHOLOGY | Facility: CLINIC | Age: 38
End: 2018-03-13
Payer: COMMERCIAL

## 2018-03-13 DIAGNOSIS — F43.23 ADJUSTMENT DISORDER WITH MIXED ANXIETY AND DEPRESSED MOOD: Primary | ICD-10-CM

## 2018-03-13 PROCEDURE — 90791 PSYCH DIAGNOSTIC EVALUATION: CPT | Performed by: MARRIAGE & FAMILY THERAPIST

## 2018-03-13 NOTE — MR AVS SNAPSHOT
"                  MRN:[id number]                      After Visit Summary   3/13/2018    Howie Yang    MRN: [id number]           Visit Information        Provider Department      3/13/2018 12:00 PM [facility]      Your next 10 appointments already scheduled     Mar 27, 2018  8:00 AM CDT   Return Visit with Renaldo Cintron [address] [address]   [phone]            2018  7:00 AM CDT   Return Visit with Renaldo Cintron [address] [address]   [phone]            2018  7:00 AM CDT   Return Visit with Renaldo Cintron [address] [address]   [phone]              MyChart Information     ADP lets you send messages to your doctor, view your test results, renew your prescriptions, schedule appointments and more. To sign up, go to [email or web address] . Click on \"Log in\" on the left side of the screen, which will take you to the Welcome page. Then click on \"Sign up Now\" on the right side of the page.     You will be asked to enter the access code listed below, as well as some personal information. Please follow the directions to create your username and password.     Your access code is: [email or web address]  Expires: 2018  7:28 AM     Your access code will  in 90 days. If you need help or a new code, please call your [facility] clinic or [phone].        Care EveryWhere ID     This is your Care EveryWhere ID. This could be used by other organizations to access your [facility] medical records  [id number]        Equal Access to Services     TREU HALE AH: destiny Edwards, " rishi ordoñez ah. So [facility] [phone].    ATENCIÓN: Si habla español, tiene a britton disposición servicios gratuitos de asistencia lingüística. Llame al [phone].    We comply with applicable federal civil rights laws and [place] laws. We do not discriminate on the basis of race, color, national origin, age, disability, sex, sexual orientation, or gender identity.

## 2018-03-13 NOTE — Clinical Note
Hello, I was seeing this patient for couples counseling but we transitioned to individual counseling to address concerns he had around sexual addiction and dealing with shame from the past that was getting in the way of his marriage being healthy. Let me know if you have any questions. Thanks,

## 2018-03-13 NOTE — PROGRESS NOTES
Adult Intake Structured Interview  Standard Diagnostic Assessment      CLIENT'S NAME: Howie Yang  MRN:   [id number]  :   1980  ACCT. NUMBER: [id number]  DATE OF SERVICE:  18  and 3/13/18      Identifying Information:  Client is a 38 year old, ,  male. Client was referred for counseling by self. Client is currently employed full time. Client attended the session alone.       Client's Statement of Presenting Concern:  Client reports the reason for seeking therapy at this time as having a sex addiction and resulting marital problems.  Client stated that his symptoms have resulted in the following functional impairments: childcare / parenting, management of the household and or completion of tasks, relationship(s), self-care and social interactions      History of Presenting Concern:  Client reports that these problem(s) began 26 years ago. Client has attempted to resolve these concerns in the past through AA and marriage counseling. Client reports that other professional(s) are not involved in providing support / services.       Social History:  Client reported he grew up in [place], [place]. They were the 6th born of 6 children. This is an intact family and parents remain . Client reported that his childhood was amazing and great. Client described his current relationships with family of origin as good.    Client reported a history of 1 committed relationship/marriage. Client has been  for 6 years. Client reported having 2 children. Client identified extensive stable and meaningful social connections. Client reported that he has not been involved with the legal system. Client's highest education level was diploma. Client did not identify any learning problems. There are ethnic, cultural or Pentecostalism factors that may be relavent for therapy. These factors will be addressed in the Preliminary  Treatment plan. Client identified his preferred language to be English. Client reported he does not need the assistance of an  or other support involved in therapy. Modifications will not be used to assist communication in therapy. Client did not serve in the .     Client reports family history includes Hypertension in his brother and father.    Mental Health History:  Client reported no family history of mental health issues.  Client has not been previously diagnosed with a mental health diagnosis.  Client has not received mental health services in the past.  Hospitalizations: None.  Client is currently receiving the following services: counseling.      Chemical Health History:  Client reported the following biological family members or relatives with chemical health issues: Brother reportedly used alcohol , Mother reportedly used alcohol . Client has not received chemical dependency treatment in the past. Client is currently receiving the following services: participate(s) in AA / NA . Client reports no problems as a result of their drinking / drug use.      Client Reports:  Client denies using alcohol.  Client denies using tobacco.  Client denies using marijuana.  Client denies using caffeine.  Client denies using street drugs.  Client denies the non-medical use of prescription or over the counter drugs.    CAGE: None of the patient's responses to the CAGE screening were positive / Negative CAGE score   Based on the negative Cage-Aid score and clinical interview there  are not indications of drug or alcohol abuse.    Discussed the general effects of drugs and alcohol on health and well-being.       Significant Losses / Trauma / Abuse / Neglect Issues:  There are indications or report of significant loss, trauma, abuse or neglect issues related to: death of cousin age 16 and grandma when client was 16.    Issues of possible neglect are not present.      Medical Issues:  Client has not had a  physical exam to rule out medical causes for current symptoms. Date of last physical exam was greater than a year ago and client was encouraged to schedule an exam with PCP. The client has a [facility] Provider, who is named Tian Badillo. The client reports not having a psychiatrist. Client reports no current medical concerns. The client denies the presence of chronic or episodic pain. There are not significant nutritional concerns.     Client reports current meds as:   Current Outpatient Prescriptions   Medication Sig     guaiFENesin-codeine (ROBITUSSIN AC) 100-10 MG/5ML SOLN solution Take 5 mLs by mouth every 4 hours as needed for cough (Patient not taking: Reported on 3/14/2018)     order for DME Equipment ordered: RESMED Auto PAP Mask type: Full face  Settings: 5-15 CM H2O     IBUPROFEN PO Take 600 mg by mouth     No current facility-administered medications for this visit.        Client 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]      No Known Drug Allergies      no known allergies to medications    Medical History:  Past Medical History:   Diagnosis Date     NO ACTIVE PROBLEMS          Medication Adherence:  N/A - Client does not have prescribed psychiatric medications.    Client was provided recommendation to follow-up with prescribing physician.    Mental Status Assessment:  Appearance:   Disheveled   Eye Contact:   Fair   Psychomotor Behavior: Normal   Attitude:   Cooperative  Guarded   Orientation:   All  Speech   Rate / Production: Hyperverbal  Normal    Volume:  Loud   Mood:    Anxious  Normal  Affect:    Appropriate  Expansive  Labile   Thought Content:  Clear  Perservative  Rumination   Thought Form:  Coherent  Logical  Obsessive  Tangential   Insight:    Fair       Review of Symptoms:  Depression: Sleep Interest Guilt Appetite Worthless Ruminations Irritability  Jessica:  No symptoms  Psychosis: No symptoms  Anxiety: Worries  Nervousness  Panic:  No symptoms  Post Traumatic Stress Disorder: No symptoms  Obsessive Compulsive Disorder: No symptoms  Eating Disorder: No symptoms  Oppositional Defiant Disorder: No symptoms  ADD / ADHD: No symptoms  Conduct Disorder: No symptoms      Safety Assessment:    History of Safety Concerns:   Client reported a history of suicidal ideation.  Onset: age 28 and frequency: weekly.  Client identified the following triggers to suicidal ideation: feeling hopeless--addiction  Client reported a history of suicide attempt(s): number of previous suicide attempts: 1, when were suicide attempt(s): 2009, methods: knife and gun, interventions for suicide attempts: giving his life to Jb.   Client denied a history of homicidal ideation.    Client denied a history of self-injurious ideation and behaviors.    Client denied a history of personal safety concerns.    Client denied a history of assaultive behaviors.        Current Safety Concerns:  Client denies current suicidal ideation.    Client denies current homicidal ideation and behaviors.  Client denies current self-injurious ideation and behaviors.    Client denies current concerns for personal safety.      Client reports there are firearms in the house. The firearms are secured in a locked space.     Plan for Safety and Risk Management:  A safety and risk management plan has not been developed at this time, however client was given the after-hours number / 911 should there be a change in any of these risk factors.    Client's Strengths and Limitations:  Client identified the following strengths or resources that will help him succeed in counseling: Spiritism, hussain / spirituality, friends / good social support and positive work environment. Client identified the following supports: family, Congregational / spirituality, friends and support group. Things that may interfere with the client's success in counseling include: financial hardship.      Diagnostic Criteria:  SUDHEER  The development of emotional or behavioral symptoms in response to an identifiable stressor(s) occurring within 3 months of the onset of the stressor(s)  B. These symptoms or behaviors are clinically significant, as evidenced by one or both of the following:       - Marked distress that is out of proportion to the severity/intensity of the stressor (with consideration for external context & culture)       - Significant impairment in social, occupational, or other important areas of functioning  C. The stress-related disturbance does not meet criteria for another disorder & is not not an exacerbation of another mental disorder  D. The symptoms do not represent normal bereavement  E. Once the stressor or its consequences have terminated, the symptoms do not persist for more than an additional 6 months       * Adjustment Disorder with Mixed Anxiety and Depressed Mood: The predominant manifestation is a combination of depression and anxiety      Functional Status:  Client's symptoms have caused and are causing reduced functional status in the following areas: Activities of Daily Living - -  Social / Relational - -      DSM5 Diagnoses: (Sustained by DSM5 Criteria Listed Above)  Diagnoses: Adjustment Disorders  309.28 (F43.23) With mixed anxiety and depressed mood  Psychosocial & Contextual Factors: Was in marriage counseling with wife for parenting differences, but has moved to doing individual therapy because of a sexual addiction coming into the light for them as a couple that client wants to work through on his own before finishing marriage counseling.   WHODAS 2.0 (12 item)            This questionnaire asks about difficulties due to health conditions. Health conditions  include  disease or illnesses, other health problems that may be short or long lasting,  injuries, mental health or emotional problems, and problems with alcohol or drugs.                     Think back over the past 30 days and answer these  questions, thinking about how much  difficulty you had doing the following activities. For each question, please [place] only  one response.    S1 Standing for long periods such as 30 minutes? Mild =           2   S2 Taking care of household responsibilities? None =         1   S3 Learning a new task, for example, learning how to get to a new place? Mild =           2   S4 How much of a problem do you have joining community activities (for example, festivals, Roman Catholic or other activities) in the same way as anyone else can? None =         1   S5 How much have you been emotionally affected by your health problems? Moderate =   3     In the past 30 days, how much difficulty did you have in:   S6 Concentrating on doing something for ten minutes? None =         1   S7 Walking a long distance such as a kilometer (or equivalent)? Mild =           2   S8 Washing your whole body? None =         1   S9 Getting dressed? None =         1   S10 Dealing with people you do not know? None =         1   S11 Maintaining a friendship? None =         1   S12 Your day to day work? None =         1     H1 Overall, in the past 30 days, how many days were these difficulties present? Record number of days 5   H2 In the past 30 days, for how many days were you totally unable to carry out your usual activities or work because of any health condition? Record number of days  0   H3 In the past 30 days, not counting the days that you were totally unable, for how many days did you cut back or reduce your usual activities or work because of any health condition? Record number of days 0     Attendance Agreement:  Client has signed Attendance Agreement:Yes      Collaboration:  Collaboration with other professionals is not indicated at this time.      Preliminary Treatment Plan:  Client's identified Roman Catholic issues will be addressed by exploring client's Roman Catholic beliefs and values and incorporating them into the discussions of his presenting  problem and its treatment.     services are not indicated.    Modifications to assist communication are not indicated.    The concerns identified by the client will be addressed in therapy.    Initial Treatment will focus on: Relational Problems related to: Conflict or difficulties with partner/spouse.    As a preliminary treatment goal, client will address relationship difficulties in a more adaptive manner.    The focus of initial interventions will be to alleviate compulsive behavior(s), facilitate appropriate expression of feelings, teach communication skills, teach effective parenting skills and teach mindfulness skills.    Referral to another professional/service is not indicated at this time..    A Release of Information is not needed at this time.    Report to child / adult protection services was NA.    Client will have access to their [facility]' medical record.    ADA Hennessy  March 13, 2018

## 2018-03-14 ENCOUNTER — OFFICE VISIT (OUTPATIENT)
Dept: SLEEP MEDICINE | Facility: CLINIC | Age: 38
End: 2018-03-14
Payer: COMMERCIAL

## 2018-03-14 VITALS
DIASTOLIC BLOOD PRESSURE: 67 MMHG | HEART RATE: 95 BPM | BODY MASS INDEX: 37.25 KG/M2 | SYSTOLIC BLOOD PRESSURE: 143 MMHG | OXYGEN SATURATION: 97 % | WEIGHT: 275 LBS | HEIGHT: 72 IN | RESPIRATION RATE: 18 BRPM

## 2018-03-14 DIAGNOSIS — G47.33 OSA (OBSTRUCTIVE SLEEP APNEA): Primary | ICD-10-CM

## 2018-03-14 PROCEDURE — 99213 OFFICE O/P EST LOW 20 MIN: CPT | Performed by: PHYSICIAN ASSISTANT

## 2018-03-14 NOTE — NURSING NOTE
"Chief Complaint   Patient presents with     CPAP Follow Up     1 month f/u from starting cpap machine       Initial /67  Pulse 95  Resp 18  Ht 1.832 m (6' 0.13\")  Wt 124.7 kg (275 lb)  SpO2 97%  BMI 37.16 kg/m2 Estimated body mass index is 37.16 kg/(m^2) as calculated from the following:    Height as of this encounter: 1.832 m (6' 0.13\").    Weight as of this encounter: 124.7 kg (275 lb).  Medication Reconciliation: complete     [place]= 3    Елена Terrell CMA       "

## 2018-03-14 NOTE — PROGRESS NOTES
"Obstructive Sleep Apnea- PAP Follow-Up Visit:    Chief Complaint   Patient presents with     CPAP Follow Up     1 month f/u from starting cpap machine       Howie Yang comes in today for follow-up of their moderate sleep apnea, managed with CPAP.     No specialty comments available.    Overall, he rates the experience with PAP as 9 (0 poor, 10 great). The mask is comfortable.  The mask is not leaking .  He is not snoring with the mask on. He is not having gasp arousals.  He is not having significant oral/nasal dryness. The pressure is comfortable.     His PAP interface is Full Face Mask.     Patient is using PAP therapy 6-7 hours per night. The patient is usually getting 6-7 hours of sleep per night.    He does feel rested in the morning.    Springer Sleepiness Scale: 3/24      ResMed   CPAP 0 cmH2O 30 day usage data:  76% of days with > 4 hours of use. 6/30 days with no use. Average use 303 minutes per day.   95%ile Leak 16.3 L/min.   AHI 1.19 events per hour.     Auto-PAP 5.0 - 15.0 cmH2O 30 day usage data:    76% of days with > 4 hours of use. 6/30 days with no use. Average use 303 minutes per day.   95%ile Leak 16.3 L/min.   CPAP 95% pressure 13.1 cm.   AHI 1.19 events per hour.         Past medical/surgical history, family history, social history, medications and allergies were reviewed.      Problem List:  Patient Active Problem List    Diagnosis Date Noted     CARDIOVASCULAR SCREENING; LDL GOAL LESS THAN 160 10/31/2010     Priority: Medium        /67  Pulse 95  Resp 18  Ht 1.832 m (6' 0.13\")  Wt 124.7 kg (275 lb)  SpO2 97%  BMI 37.16 kg/m2    Springer= 3    Impression/Plan:     Moderate Sleep apnea. He is Tolerating PAP very well. Daytime symptoms are improved..       Howie Yang will follow up in about 1 year(s).     Fifteen minutes spent with patient, all of which were spent face-to-face counseling, consulting, coordinating plan of care.            CC:  Tian Badillo,     "

## 2018-03-14 NOTE — MR AVS SNAPSHOT
After Visit Summary   3/14/2018    Howie Yang    MRN: [id number]           Patient Information     Date Of Birth          1980        Visit Information        Provider Department      3/14/2018 11:30 AM Deshawn Cooney PA-C [facility]        Today's Diagnoses     AYANA (obstructive sleep apnea)    -  1       Follow-ups after your visit        Follow-up notes from your care team     Return in about 1 year (around 3/14/2019).      Your next 10 appointments already scheduled     Mar 20, 2018  8:00 AM CDT   Return Visit with Renaldo Cintron [address] [address] [phone]            Mar 27, 2018  8:00 AM CDT   Return Visit with Renaldo Cintron [address] [address] [phone]            Apr 04, 2018  7:00 AM CDT   Return Visit with Renaldo Cintron [address] [address]   [phone]            Apr 25, 2018  7:00 AM CDT   Return Visit with Renaldo Cintron [address] [address] [phone]              Who to contact     If you have questions or need follow up information about today's clinic visit or your schedule please contact [facility] directly at [phone].  Normal or non-critical lab and imaging results will be communicated to you by MyChart, letter or phone within 4 business days after the clinic has received the results. If you do not hear from us within 7 days, please contact the clinic through MyChart or phone. If you have a critical or abnormal lab result, we will notify you by phone as soon as possible.  Submit refill  "requests through Gonway or call your pharmacy and they will forward the refill request to us. Please allow 3 business days for your refill to be completed.          Additional Information About Your Visit        EosceneharTHE Football App Information     Gonway lets you send messages to your doctor, view your test results, renew your prescriptions, schedule appointments and more. To sign up, go to [email or web address] . Click on \"Log in\" on the left side of the screen, which will take you to the Welcome page. Then click on \"Sign up Now\" on the right side of the page.     You will be asked to enter the access code listed below, as well as some personal information. Please follow the directions to create your username and password.     Your access code is: [email or web address]  Expires: 2018  7:28 AM     Your access code will  in 90 days. If you need help or a new code, please call your [facility] clinic or [phone].        Care EveryWhere ID     This is your Care EveryWhere ID. This could be used by other organizations to access your [facility] medical records  [id number]        Your Vitals Were     Pulse Respirations Height Pulse Oximetry BMI (Body Mass Index)       95 18 1.832 m (6' 0.13\") 97% 37.16 kg/m2        Blood Pressure from Last 3 Encounters:   18 143/67   18 130/84   18 139/84    Weight from Last 3 Encounters:   18 124.7 kg (275 lb)   18 120.8 kg (266 lb 4.8 oz)   18 125.1 kg (275 lb 14.4 oz)              We Performed the Following     Comprehensive DME        Primary Care Provider Office Phone # Fax #    Tian Badillo [phone] [address] [place] [address]        Equal Access to Services     TRUE HALE : Fausto Henderson, wajulisa porrasqpa, qasandrata kaalsangeeta zambrano, rishi koehler. [facility] [phone].    ATENCIÓN: Si habla español, tiene a britton disposición servicios gratuitos de asistencia " lingüísticaBekah Kulkarni al [phone].    We comply with applicable federal civil rights laws and [place] laws. We do not discriminate on the basis of race, color, national origin, age, disability, sex, sexual orientation, or gender identity.            Thank you!     Thank you for choosing [facility]  for your care. Our goal is always to provide you with excellent care. Hearing back from our patients is one way we can continue to improve our services. Please take a few minutes to complete the written survey that you may receive in the mail after your visit with us. Thank you!             Your Updated Medication List - Protect others around you: Learn how to safely use, store and throw away your medicines at www.disposemymeds.org.          This list is accurate as of 3/14/18 12:16 PM.  Always use your most recent med list.                   Brand Name Dispense Instructions for use Diagnosis    guaiFENesin-codeine 100-10 MG/5ML Soln solution    ROBITUSSIN AC    240 mL    Take 5 mLs by mouth every 4 hours as needed for cough    Cough, Abnormal chest sounds, Fever, unspecified fever cause, Influenza B       IBUPROFEN PO      Take 600 mg by mouth        order for DME      Equipment ordered: RESMED Auto PAP Mask type: Full face  Settings: 5-15 CM H2O

## 2018-03-20 ENCOUNTER — OFFICE VISIT (OUTPATIENT)
Dept: PSYCHOLOGY | Facility: CLINIC | Age: 38
End: 2018-03-20
Payer: COMMERCIAL

## 2018-03-20 DIAGNOSIS — F43.23 ADJUSTMENT DISORDER WITH MIXED ANXIETY AND DEPRESSED MOOD: Primary | ICD-10-CM

## 2018-03-20 PROCEDURE — 90834 PSYTX W PT 45 MINUTES: CPT | Performed by: MARRIAGE & FAMILY THERAPIST

## 2018-03-20 NOTE — MR AVS SNAPSHOT
"                  MRN:[id number]                      After Visit Summary   3/20/2018    Howie Yang    MRN: [id number]           Visit Information        Provider Department      3/20/2018 8:00 AM Renaldo Cintron LM Generic      Your next 10 appointments already scheduled     Mar 27, 2018  8:00 AM CDT   Return Visit with Renaldo Cintron [address] [address]   [phone]            2018  7:00 AM CDT   Return Visit with Renaldo Cintron [address] [address]   [phone]            2018  7:00 AM CDT   Return Visit with Renaldo Cintron [address] [address]   [phone]              MyChart Information     prettysecrets lets you send messages to your doctor, view your test results, renew your prescriptions, schedule appointments and more. To sign up, go to [email or web address] . Click on \"Log in\" on the left side of the screen, which will take you to the Welcome page. Then click on \"Sign up Now\" on the right side of the page.     You will be asked to enter the access code listed below, as well as some personal information. Please follow the directions to create your username and password.     Your access code is: [email or web address]  Expires: 2018  7:28 AM     Your access code will  in 90 days. If you need help or a new code, please call your [place] clinic or [phone].        Care EveryWhere ID     This is your Care EveryWhere ID. This could be used by other organizations to access your [place] medical records  [id number]        Equal Access to Services     TRUE HALE AH: destiny Edwards, " rishi ordoñez ah. So [facility] [phone].    ATENCIÓN: Si habla español, tiene a britton disposición servicios gratuitos de asistencia lingüística. Llame al [phone].    We comply with applicable federal civil rights laws and [place] laws. We do not discriminate on the basis of race, color, national origin, age, disability, sex, sexual orientation, or gender identity.

## 2018-03-20 NOTE — PROGRESS NOTES
Progress Note    Client Name: Howie Yang  Date: 3/20/18         Service Type: Individual      Session Start Time: 8:08 am  Session End Time: 9:00 am      Session Length: 52 minutes     Session #: 3     Attendees: Client attended alone    Treatment Plan Last Reviewed: today  PHQ-9 / MARYANNE-7 : 2 / 0     DATA      Progress Since Last Session (Related to Symptoms / Goals / Homework):   Symptoms: Improved; is having more positive and satisfying sexual and non-sexual interactions and experiences with spouse.     Homework: n/a      Episode of Care Goals: Satisfactory progress - ACTION (Actively working towards change); Intervened by reinforcing change plan / affirming steps taken     Current / Ongoing Stressors and Concerns:   Was in marriage counseling with wife for parenting differences, but has moved to doing individual therapy because of a sexual addiction coming into the light for them as a couple that client wants to work through on his own before finishing marriage counseling.      Treatment Objective(s) Addressed in This Session:   track and record at least 10 pleasant exchanges with partner       Intervention:   Created treatment plan with client and processed recent improvements in his marriage        ASSESSMENT: Current Emotional / Mental Status (status of significant symptoms):   Risk status (Self / Other harm or suicidal ideation)   Client denies current fears or concerns for personal safety.   Client denies current or recent suicidal ideation or behaviors.   Client denies current or recent homicidal ideation or behaviors.   Client denies current or recent self injurious behavior or ideation.   Client denies other safety concerns.   A safety and risk management plan has not been developed at this time, however client was given the after-hours number / 911 should there be a change in any of these risk factors.     Appearance:   Disheveled    Eye Contact:   Fair     Psychomotor Behavior: Normal    Attitude:   Cooperative    Orientation:   All   Speech    Rate / Production: Normal     Volume:  Loud    Mood:    Normal   Affect:    Appropriate    Thought Content:  Clear    Thought Form:  Coherent  Logical    Insight:    Good      Medication Review:   No current psychiatric medications prescribed     Medication Compliance:   NA     Changes in Health Issues:   None reported     Chemical Use Review:   Substance Use: Chemical use reviewed, no active concerns identified      Tobacco Use: No current tobacco use.       Collateral Reports Completed:   Not Applicable    PLAN: (Client Tasks / Therapist Tasks / Other)  Reinforce positive changes in self and marriage. Continue building emotional regulation skills.        ADA Hennessy                                                         ________________________________________________________________________    Treatment Plan    Client's Name: Howie Yang  YOB: 1980    Date: 3/20/18    DSM-V Diagnoses: Adjustment Disorders  309.28 (F43.23) With mixed anxiety and depressed mood  Psychosocial / Contextual Factors: Was in marriage counseling with wife for parenting differences, but has moved to doing individual therapy because of a sexual addiction coming into the light for them as a couple that client wants to work through on his own before finishing marriage counseling.     WHODAS:     S1 Standing for long periods such as 30 minutes? Mild =           2   S2 Taking care of household responsibilities? None =         1   S3 Learning a new task, for example, learning how to get to a new place? Mild =           2   S4 How much of a problem do you have joining community activities (for example, festivals, [employer] or other activities) in the same way as anyone else can? None =         1   S5 How much have you been emotionally affected by your health problems? Moderate =   3           In the past 30 days, how much  difficulty did you have in:   S6 Concentrating on doing something for ten minutes? None =         1   S7 Walking a long distance such as a kilometer (or equivalent)? Mild =           2   S8 Washing your whole body? None =         1   S9 Getting dressed? None =         1   S10 Dealing with people you do not know? None =         1   S11 Maintaining a friendship? None =         1   S12 Your day to day work? None =         1      H1 Overall, in the past 30 days, how many days were these difficulties present? Record number of days 5   H2 In the past 30 days, for how many days were you totally unable to carry out your usual activities or work because of any health condition? Record number of days  0   H3 In the past 30 days, not counting the days that you were totally unable, for how many days did you cut back or reduce your usual activities or work because of any health condition? Record number of days 0          Referral / Collaboration:  Referral to another professional/service is not indicated at this time..    Anticipated number of session or this episode of care: 8-12      MeasurableTreatment Goal(s) related to diagnosis / functional impairment(s)  Goal 1: Client will resolve core conflict preventing him from experiencing a healthy and satisfying relationship with his wife.    I will know I've met my goal when I can be truly vulnerable but safe with my spouse.      Objective #A (Client Action)    Client will track and record at least 10 pleasant exchanges with partner.  Status: New - Date: 3/20/18     Intervention(s)  Therapist will teach emotional recognition/identification. -.  Therapist will teach emotional regulation skills. -.  Therapist will teach about healthy boundaries. -.      Client has reviewed and agreed to the above plan.      ADA Hennessy  March 20, 2018

## 2018-03-27 ENCOUNTER — OFFICE VISIT (OUTPATIENT)
Dept: PSYCHOLOGY | Facility: CLINIC | Age: 38
End: 2018-03-27
Payer: COMMERCIAL

## 2018-03-27 DIAGNOSIS — F43.23 ADJUSTMENT DISORDER WITH MIXED ANXIETY AND DEPRESSED MOOD: Primary | ICD-10-CM

## 2018-03-27 PROCEDURE — 90834 PSYTX W PT 45 MINUTES: CPT | Performed by: MARRIAGE & FAMILY THERAPIST

## 2018-03-27 NOTE — PROGRESS NOTES
Progress Note    Client Name: Howie Yang  Date: 3/27/18         Service Type: Individual      Session Start Time: 8:12 am  Session End Time: 9:00 am      Session Length: 48 minutes     Session #: 4     Attendees: Client attended alone    Treatment Plan Last Reviewed: 3/20/18  PHQ-9 / MARYANNE-7 : 2 / 0     DATA      Progress Since Last Session (Related to Symptoms / Goals / Homework):   Symptoms: Improved; continues to have positive and satisfying sexual and non-sexual interactions and experiences with spouse.     Homework: Achieved / completed to satisfaction      Episode of Care Goals: Satisfactory progress - ACTION (Actively working towards change); Intervened by reinforcing change plan / affirming steps taken     Current / Ongoing Stressors and Concerns:   Was in marriage counseling with wife for parenting differences, but has moved to doing individual therapy because of a sexual addiction coming into the light for them as a couple that client wants to work through on his own before finishing marriage counseling.      Treatment Objective(s) Addressed in This Session:   track and record at least 10 pleasant exchanges with partner       Intervention:   Explored family history; roles, dynamics, attachment, trauma, etc.         ASSESSMENT: Current Emotional / Mental Status (status of significant symptoms):   Risk status (Self / Other harm or suicidal ideation)   Client denies current fears or concerns for personal safety.   Client denies current or recent suicidal ideation or behaviors.   Client denies current or recent homicidal ideation or behaviors.   Client denies current or recent self injurious behavior or ideation.   Client denies other safety concerns.   A safety and risk management plan has not been developed at this time, however client was given the after-hours number / 911 should there be a change in any of these risk factors.     Appearance:   Disheveled     Eye Contact:   Fair    Psychomotor Behavior: Normal    Attitude:   Cooperative    Orientation:   All   Speech    Rate / Production: Normal     Volume:  Loud    Mood:    Normal   Affect:    Appropriate    Thought Content:  Clear    Thought Form:  Coherent  Logical    Insight:    Good      Medication Review:   No current psychiatric medications prescribed     Medication Compliance:   NA     Changes in Health Issues:   None reported     Chemical Use Review:   Substance Use: Chemical use reviewed, no active concerns identified      Tobacco Use: No current tobacco use.       Collateral Reports Completed:   Not Applicable    PLAN: (Client Tasks / Therapist Tasks / Other)  Reinforce positive changes in self and marriage. Continue building emotional regulation skills.        ADA Hennessy                                                         ________________________________________________________________________    Treatment Plan    Client's Name: Howie Yang  YOB: 1980    Date: 3/20/18    DSM-V Diagnoses: Adjustment Disorders  309.28 (F43.23) With mixed anxiety and depressed mood  Psychosocial / Contextual Factors: Was in marriage counseling with wife for parenting differences, but has moved to doing individual therapy because of a sexual addiction coming into the light for them as a couple that client wants to work through on his own before finishing marriage counseling.     WHODAS:     S1 Standing for long periods such as 30 minutes? Mild =           2   S2 Taking care of household responsibilities? None =         1   S3 Learning a new task, for example, learning how to get to a new place? Mild =           2   S4 How much of a problem do you have joining community activities (for example, festivals, [employer]ism or other activities) in the same way as anyone else can? None =         1   S5 How much have you been emotionally affected by your health problems? Moderate =   3           In the past  30 days, how much difficulty did you have in:   S6 Concentrating on doing something for ten minutes? None =         1   S7 Walking a long distance such as a kilometer (or equivalent)? Mild =           2   S8 Washing your whole body? None =         1   S9 Getting dressed? None =         1   S10 Dealing with people you do not know? None =         1   S11 Maintaining a friendship? None =         1   S12 Your day to day work? None =         1      H1 Overall, in the past 30 days, how many days were these difficulties present? Record number of days 5   H2 In the past 30 days, for how many days were you totally unable to carry out your usual activities or work because of any health condition? Record number of days  0   H3 In the past 30 days, not counting the days that you were totally unable, for how many days did you cut back or reduce your usual activities or work because of any health condition? Record number of days 0          Referral / Collaboration:  Referral to another professional/service is not indicated at this time..    Anticipated number of session or this episode of care: 8-12      MeasurableTreatment Goal(s) related to diagnosis / functional impairment(s)  Goal 1: Client will resolve core conflict preventing him from experiencing a healthy and satisfying relationship with his wife.    I will know I've met my goal when I can be truly vulnerable but safe with my spouse.      Objective #A (Client Action)    Client will track and record at least 10 pleasant exchanges with partner.  Status: New - Date: 3/20/18     Intervention(s)  Therapist will teach emotional recognition/identification. -.  Therapist will teach emotional regulation skills. -.  Therapist will teach about healthy boundaries. -.      Client has reviewed and agreed to the above plan.      ADA Hennessy  March 20, 2018

## 2018-03-27 NOTE — MR AVS SNAPSHOT
"                  MRN:[id number]                      After Visit Summary   3/27/2018    Howie Yang    MRN: [id number]           Visit Information        Provider Department      3/27/2018 8:00 AM Renaldo Cintron LMFT [facility] Generic      Your next 10 appointments already scheduled     2018  7:00 AM CDT   Return Visit with Renaldo Cintron [facility] ([address] [address]   [phone]            2018  7:00 AM CDT   Return Visit with Renaldo Cintron [facility] ([address] [id number] [phone]              MyChart Information     [employer] lets you send messages to your doctor, view your test results, renew your prescriptions, schedule appointments and more. To sign up, go to [email or web address] . Click on \"Log in\" on the left side of the screen, which will take you to the Welcome page. Then click on \"Sign up Now\" on the right side of the page.     You will be asked to enter the access code listed below, as well as some personal information. Please follow the directions to create your username and password.     Your access code is: [email or web address]  Expires: 2018  7:28 AM     Your access code will  in 90 days. If you need help or a new code, please call your [place] clinic or [phone].        Care EveryWhere ID     This is your Care EveryWhere ID. This could be used by other organizations to access your [place] medical records  [id number]        Equal Access to Services     TRUE HALE : Fausto Henderson, wajulisa thakkar, eva kaalmada kenya, rishi koehler. So [facility] [phone].    ATENCIÓN: Si habla español, tiene a britton disposición servicios gratuitos de asistencia lingüística. Llame al " [phone].    We comply with applicable federal civil rights laws and [place] laws. We do not discriminate on the basis of race, color, national origin, age, disability, sex, sexual orientation, or gender identity.

## 2018-04-25 ENCOUNTER — OFFICE VISIT (OUTPATIENT)
Dept: PSYCHOLOGY | Facility: CLINIC | Age: 38
End: 2018-04-25
Payer: COMMERCIAL

## 2018-04-25 DIAGNOSIS — F43.23 ADJUSTMENT DISORDER WITH MIXED ANXIETY AND DEPRESSED MOOD: Primary | ICD-10-CM

## 2018-04-25 PROCEDURE — 90834 PSYTX W PT 45 MINUTES: CPT | Performed by: MARRIAGE & FAMILY THERAPIST

## 2018-04-25 NOTE — MR AVS SNAPSHOT
"                  MRN:[id number]                      After Visit Summary   2018    Howie Yang    MRN: [id number]           Visit Information        Provider Department      2018 7:00 [facility]      Your next 10 appointments already scheduled     May 08, 2018 12:00 PM CDT   Return Visit with Renaldo Cintron [facility] ([address] [address]   [phone]            May 22, 2018 12:00 PM CDT   Return Visit with Renaldo Cintron [facility] ([address] [id number] [phone]              MyChart Information     travelfoxt lets you send messages to your doctor, view your test results, renew your prescriptions, schedule appointments and more. To sign up, go to [email or web address] . Click on \"Log in\" on the left side of the screen, which will take you to the Welcome page. Then click on \"Sign up Now\" on the right side of the page.     You will be asked to enter the access code listed below, as well as some personal information. Please follow the directions to create your username and password.     Your access code is: [email or web address]  Expires: 2018  9:17 PM     Your access code will  in 90 days. If you need help or a new code, please call your [place] clinic or [phone].        Care EveryWhere ID     This is your Care EveryWhere ID. This could be used by other organizations to access your [place] medical records  [id number]        Equal Access to Services     TRUE HALE : Fausto Henderson, wagraceda luelroy, qajosé miguel kaalmada kenya, rishi koehler. So [facility] [phone].    ATENCIÓN: Si habla español, tiene a britton disposición servicios gratuitos de asistencia lingüística. Llame al " [phone].    We comply with applicable federal civil rights laws and [place] laws. We do not discriminate on the basis of race, color, national origin, age, disability, sex, sexual orientation, or gender identity.

## 2018-04-26 NOTE — PROGRESS NOTES
Progress Note    Client Name: Howie Yang  Date: 4/25/18         Service Type: Individual      Session Start Time: 7:06 am  Session End Time: 7:50 am      Session Length: 44 minutes     Session #: 4     Attendees: Client attended alone    Treatment Plan Last Reviewed: 3/20/18  PHQ-9 / MARYANNE-7 : 2 / 0     DATA      Progress Since Last Session (Related to Symptoms / Goals / Homework):   Symptoms: Improved     Homework: Achieved / completed to satisfaction      Episode of Care Goals: Achieved / completed to satisfaction - MAINTENANCE (Working to maintain change, with risk of relapse); Intervened by continuing to positively reinforce healthy behavior choice      Current / Ongoing Stressors and Concerns:   Was in marriage counseling with wife for parenting differences, but has moved to doing individual therapy because of a sexual addiction coming into the light for them as a couple that client wants to work through on his own before finishing marriage counseling.      Treatment Objective(s) Addressed in This Session:   track and record at least 10 pleasant exchanges with partner       Intervention:   CBT: Reinforced client's improvement in relational functioning and emotional regulation.         ASSESSMENT: Current Emotional / Mental Status (status of significant symptoms):   Risk status (Self / Other harm or suicidal ideation)   Client denies current fears or concerns for personal safety.   Client denies current or recent suicidal ideation or behaviors.   Client denies current or recent homicidal ideation or behaviors.   Client denies current or recent self injurious behavior or ideation.   Client denies other safety concerns.   A safety and risk management plan has not been developed at this time, however client was given the after-hours number / 911 should there be a change in any of these risk factors.     Appearance:   Disheveled    Eye Contact:   Fair    Psychomotor  Behavior: Normal    Attitude:   Cooperative    Orientation:   All   Speech    Rate / Production: Normal     Volume:  Normal    Mood:    Normal   Affect:    Appropriate    Thought Content:  Clear    Thought Form:  Coherent  Logical    Insight:    Good      Medication Review:   No current psychiatric medications prescribed     Medication Compliance:   NA     Changes in Health Issues:   None reported     Chemical Use Review:   Substance Use: Chemical use reviewed, no active concerns identified      Tobacco Use: No current tobacco use.       Collateral Reports Completed:   Not Applicable    PLAN: (Client Tasks / Therapist Tasks / Other)  Continue to reinforce positive changes in self/marriage and teach emotional regulation skills.        ADA Hennessy                                                         ________________________________________________________________________    Treatment Plan    Client's Name: Howie Yang  YOB: 1980    Date: 3/20/18    DSM-V Diagnoses: Adjustment Disorders  309.28 (F43.23) With mixed anxiety and depressed mood  Psychosocial / Contextual Factors: Was in marriage counseling with wife for parenting differences, but has moved to doing individual therapy because of a sexual addiction coming into the light for them as a couple that client wants to work through on his own before finishing marriage counseling.     WHODAS:     S1 Standing for long periods such as 30 minutes? Mild =           2   S2 Taking care of household responsibilities? None =         1   S3 Learning a new task, for example, learning how to get to a new place? Mild =           2   S4 How much of a problem do you have joining community activities (for example, festivals, Baptism or other activities) in the same way as anyone else can? None =         1   S5 How much have you been emotionally affected by your health problems? Moderate =   3           In the past 30 days, how much difficulty did you  have in:   S6 Concentrating on doing something for ten minutes? None =         1   S7 Walking a long distance such as a kilometer (or equivalent)? Mild =           2   S8 Washing your whole body? None =         1   S9 Getting dressed? None =         1   S10 Dealing with people you do not know? None =         1   S11 Maintaining a friendship? None =         1   S12 Your day to day work? None =         1      H1 Overall, in the past 30 days, how many days were these difficulties present? Record number of days 5   H2 In the past 30 days, for how many days were you totally unable to carry out your usual activities or work because of any health condition? Record number of days  0   H3 In the past 30 days, not counting the days that you were totally unable, for how many days did you cut back or reduce your usual activities or work because of any health condition? Record number of days 0          Referral / Collaboration:  Referral to another professional/service is not indicated at this time..    Anticipated number of session or this episode of care: 8-12      MeasurableTreatment Goal(s) related to diagnosis / functional impairment(s)  Goal 1: Client will resolve core conflict preventing him from experiencing a healthy and satisfying relationship with his wife.    I will know I've met my goal when I can be truly vulnerable but safe with my spouse.      Objective #A (Client Action)    Client will track and record at least 10 pleasant exchanges with partner.  Status: New - Date: 3/20/18     Intervention(s)  Therapist will teach emotional recognition/identification. -.  Therapist will teach emotional regulation skills. -.  Therapist will teach about healthy boundaries. -.      Client has reviewed and agreed to the above plan.      ADA Hennessy  March 20, 2018

## 2018-05-08 ENCOUNTER — OFFICE VISIT (OUTPATIENT)
Dept: PSYCHOLOGY | Facility: CLINIC | Age: 38
End: 2018-05-08
Payer: COMMERCIAL

## 2018-05-08 DIAGNOSIS — F43.23 ADJUSTMENT DISORDER WITH MIXED ANXIETY AND DEPRESSED MOOD: Primary | ICD-10-CM

## 2018-05-08 PROCEDURE — 90834 PSYTX W PT 45 MINUTES: CPT | Performed by: MARRIAGE & FAMILY THERAPIST

## 2018-05-08 NOTE — MR AVS SNAPSHOT
"                  MRN:[id number]                      After Visit Summary   2018    Howie Yang    MRN: [id number]           Visit Information        Provider Department      2018 12:00 PM Renaldo Cintron LMFT [facility] Generic      Your next 10 appointments already scheduled     May 22, 2018 12:00 PM CDT   Return Visit with ADA Cowan   [facility] ([facility])    [address] [place] [id number] [phone]              MyChart Information     [employer] lets you send messages to your doctor, view your test results, renew your prescriptions, schedule appointments and more. To sign up, go to [email or web address] . Click on \"Log in\" on the left side of the screen, which will take you to the Welcome page. Then click on \"Sign up Now\" on the right side of the page.     You will be asked to enter the access code listed below, as well as some personal information. Please follow the directions to create your username and password.     Your access code is: [email or web address]  Expires: 2018  9:17 PM     Your access code will  in 90 days. If you need help or a new code, please call your [place] clinic or [phone].        Care EveryWhere ID     This is your Care EveryWhere ID. This could be used by other organizations to access your [place] medical records  [id number]        Equal Access to Services     [facility]: Hadii aad ku hadasho Soomaali, waaxda luqadaha, qaybta kaalmada adeegyada, waxvianney susanne garcia . So [facility] [phone].    ATENCIÓN: Si habla español, tiene a britton disposición servicios gratuitos de asistencia lingüística. Llame al [phone].    We comply with applicable federal civil rights laws and [place] laws. We do not discriminate on the basis of race, color, national origin, age, disability, sex, sexual orientation, or gender identity.       "

## 2018-05-08 NOTE — PROGRESS NOTES
Progress Note    Client Name: Howie Yang  Date: 5/8/18         Service Type: Individual      Session Start Time: 12:10 pm  Session End Time: 12:50 pm      Session Length: 40 minutes     Session #: 5     Attendees: Client attended alone    Treatment Plan Last Reviewed: 3/20/18  PHQ-9 / MARYANNE-7 : 2 / 0     DATA      Progress Since Last Session (Related to Symptoms / Goals / Homework):   Symptoms: Stable    Homework: none given      Episode of Care Goals: Minimal progress - MAINTENANCE (Working to maintain change, with risk of relapse); Intervened by continuing to positively reinforce healthy behavior choice      Current / Ongoing Stressors and Concerns:   Was in marriage counseling with wife for parenting differences, but has moved to doing individual therapy because of a sexual addiction coming into the light for them as a couple that client wants to work through on his own before finishing marriage counseling.      Treatment Objective(s) Addressed in This Session:   track and record at least 10 pleasant exchanges with partner       Intervention:   taught and reinforced anger management skills and emotional regulation in regard to stressors at home and dynamics with family members.         ASSESSMENT: Current Emotional / Mental Status (status of significant symptoms):   Risk status (Self / Other harm or suicidal ideation)   Client denies current fears or concerns for personal safety.   Client denies current or recent suicidal ideation or behaviors.   Client denies current or recent homicidal ideation or behaviors.   Client denies current or recent self injurious behavior or ideation.   Client denies other safety concerns.   A safety and risk management plan has not been developed at this time, however client was given the after-hours number / 911 should there be a change in any of these risk factors.     Appearance:   Disheveled    Eye Contact:   Fair    Psychomotor  Behavior: Normal    Attitude:   Cooperative    Orientation:   All   Speech    Rate / Production: Normal     Volume:  Normal    Mood:    Normal   Affect:    Appropriate    Thought Content:  Clear    Thought Form:  Coherent  Logical    Insight:    Good      Medication Review:   No current psychiatric medications prescribed     Medication Compliance:   NA     Changes in Health Issues:   None reported     Chemical Use Review:   Substance Use: Chemical use reviewed, no active concerns identified      Tobacco Use: No current tobacco use.       Collateral Reports Completed:   Not Applicable    PLAN: (Client Tasks / Therapist Tasks / Other)  Continue to reinforce positive changes in self/marriage and teach emotional regulation skills.        ADA Hennessy                                                         ________________________________________________________________________    Treatment Plan    Client's Name: Howie Yang  YOB: 1980    Date: 3/20/18    DSM-V Diagnoses: Adjustment Disorders  309.28 (F43.23) With mixed anxiety and depressed mood  Psychosocial / Contextual Factors: Was in marriage counseling with wife for parenting differences, but has moved to doing individual therapy because of a sexual addiction coming into the light for them as a couple that client wants to work through on his own before finishing marriage counseling.     WHODAS:     S1 Standing for long periods such as 30 minutes? Mild =           2   S2 Taking care of household responsibilities? None =         1   S3 Learning a new task, for example, learning how to get to a new place? Mild =           2   S4 How much of a problem do you have joining community activities (for example, festivals, [employer] or other activities) in the same way as anyone else can? None =         1   S5 How much have you been emotionally affected by your health problems? Moderate =   3           In the past 30 days, how much difficulty did you  have in:   S6 Concentrating on doing something for ten minutes? None =         1   S7 Walking a long distance such as a kilometer (or equivalent)? Mild =           2   S8 Washing your whole body? None =         1   S9 Getting dressed? None =         1   S10 Dealing with people you do not know? None =         1   S11 Maintaining a friendship? None =         1   S12 Your day to day work? None =         1      H1 Overall, in the past 30 days, how many days were these difficulties present? Record number of days 5   H2 In the past 30 days, for how many days were you totally unable to carry out your usual activities or work because of any health condition? Record number of days  0   H3 In the past 30 days, not counting the days that you were totally unable, for how many days did you cut back or reduce your usual activities or work because of any health condition? Record number of days 0          Referral / Collaboration:  Referral to another professional/service is not indicated at this time..    Anticipated number of session or this episode of care: 8-12      MeasurableTreatment Goal(s) related to diagnosis / functional impairment(s)  Goal 1: Client will resolve core conflict preventing him from experiencing a healthy and satisfying relationship with his wife.    I will know I've met my goal when I can be truly vulnerable but safe with my spouse.      Objective #A (Client Action)    Client will track and record at least 10 pleasant exchanges with partner.  Status: New - Date: 3/20/18     Intervention(s)  Therapist will teach emotional recognition/identification. -.  Therapist will teach emotional regulation skills. -.  Therapist will teach about healthy boundaries. -.      Client has reviewed and agreed to the above plan.      ADA Hennessy  March 20, 2018

## 2018-05-22 ENCOUNTER — OFFICE VISIT (OUTPATIENT)
Dept: PSYCHOLOGY | Facility: CLINIC | Age: 38
End: 2018-05-22
Payer: COMMERCIAL

## 2018-05-22 DIAGNOSIS — F43.23 ADJUSTMENT DISORDER WITH MIXED ANXIETY AND DEPRESSED MOOD: Primary | ICD-10-CM

## 2018-05-22 PROCEDURE — 90834 PSYTX W PT 45 MINUTES: CPT | Performed by: MARRIAGE & FAMILY THERAPIST

## 2018-05-22 NOTE — MR AVS SNAPSHOT
"                  MRN:[id number]                      After Visit Summary   2018    Howie Yang    MRN: [id number]           Visit Information        Provider Department      2018 12:00 PM Renaldo Cintron LMFT [facility] Generic      Your next 10 appointments already scheduled     2018 12:00 PM CDT   Return Visit with Renaldo Cintron [facility] ([address] [address]   [phone]            2018 12:00 PM CDT   [facility] ([address] [address]   [phone]              MyChart Information     [employer] lets you send messages to your doctor, view your test results, renew your prescriptions, schedule appointments and more. To sign up, go to [email or web address] . Click on \"Log in\" on the left side of the screen, which will take you to the Welcome page. Then click on \"Sign up Now\" on the right side of the page.     You will be asked to enter the access code listed below, as well as some personal information. Please follow the directions to create your username and password.     Your access code is: [email or web address]  Expires: 2018  9:17 PM     Your access code will  in 90 days. If you need help or a new code, please call your [place] clinic or [phone].        Care EveryWhere ID     This is your Care EveryWhere ID. This could be used by other organizations to access your [place] medical records  [id number]        Equal Access to Services     TRUE HALE : Fausto Henderson, wajulisa luelroy, qajosé miguel kaalmada kenya, rishi koehler. So [facility] [phone].    ATENCIÓN: Si habla español, tiene a britton disposición servicios gratuitos de asistencia lingüística. Llame al " [phone].    We comply with applicable federal civil rights laws and [place] laws. We do not discriminate on the basis of race, color, national origin, age, disability, sex, sexual orientation, or gender identity.

## 2018-05-23 NOTE — PROGRESS NOTES
Progress Note    Client Name: Howie Yang  Date: 5/22/18         Service Type: Individual      Session Start Time: 12:10 pm  Session End Time: 12:58 pm      Session Length: 48 minutes     Session #: 7     Attendees: Client attended alone    Treatment Plan Last Reviewed: 3/20/18  PHQ-9 / MARYANNE-7 : 2 / 0     DATA      Progress Since Last Session (Related to Symptoms / Goals / Homework):   Symptoms: Stable    Homework: none given      Episode of Care Goals: Satisfactory progress - MAINTENANCE (Working to maintain change, with risk of relapse); Intervened by continuing to positively reinforce healthy behavior choice      Current / Ongoing Stressors and Concerns:   Was in marriage counseling with wife for parenting differences, but has moved to doing individual therapy because of a sexual addiction coming into the light for them as a couple that client wants to work through on his own before finishing marriage counseling.      Treatment Objective(s) Addressed in This Session:   track and record at least 10 pleasant exchanges with partner       Intervention:   Taught client parenting skills and explored his desire to improve his relationship with his son.        ASSESSMENT: Current Emotional / Mental Status (status of significant symptoms):   Risk status (Self / Other harm or suicidal ideation)   Client denies current fears or concerns for personal safety.   Client denies current or recent suicidal ideation or behaviors.   Client denies current or recent homicidal ideation or behaviors.   Client denies current or recent self injurious behavior or ideation.   Client denies other safety concerns.   A safety and risk management plan has not been developed at this time, however client was given the after-hours number / 911 should there be a change in any of these risk factors.     Appearance:   Appropriate    Eye Contact:   Good    Psychomotor Behavior: Normal     Attitude:   Cooperative    Orientation:   All   Speech    Rate / Production: Normal     Volume:  Normal    Mood:    Normal   Affect:    Appropriate    Thought Content:  Clear    Thought Form:  Coherent  Logical    Insight:    Good      Medication Review:   No current psychiatric medications prescribed     Medication Compliance:   NA     Changes in Health Issues:   None reported     Chemical Use Review:   Substance Use: Chemical use reviewed, no active concerns identified      Tobacco Use: No current tobacco use.       Collateral Reports Completed:   Not Applicable    PLAN: (Client Tasks / Therapist Tasks / Other)  Continue to reinforce positive changes in self/marriage and teach emotional regulation skills.        ADA Hennessy                                                         ________________________________________________________________________    Treatment Plan    Client's Name: Howie Yang  YOB: 1980    Date: 3/20/18    DSM-V Diagnoses: Adjustment Disorders  309.28 (F43.23) With mixed anxiety and depressed mood  Psychosocial / Contextual Factors: Was in marriage counseling with wife for parenting differences, but has moved to doing individual therapy because of a sexual addiction coming into the light for them as a couple that client wants to work through on his own before finishing marriage counseling.     WHODAS:     S1 Standing for long periods such as 30 minutes? Mild =           2   S2 Taking care of household responsibilities? None =         1   S3 Learning a new task, for example, learning how to get to a new place? Mild =           2   S4 How much of a problem do you have joining community activities (for example, festivals, [employer] or other activities) in the same way as anyone else can? None =         1   S5 How much have you been emotionally affected by your health problems? Moderate =   3           In the past 30 days, how much difficulty did you have in:   S6  Concentrating on doing something for ten minutes? None =         1   S7 Walking a long distance such as a kilometer (or equivalent)? Mild =           2   S8 Washing your whole body? None =         1   S9 Getting dressed? None =         1   S10 Dealing with people you do not know? None =         1   S11 Maintaining a friendship? None =         1   S12 Your day to day work? None =         1      H1 Overall, in the past 30 days, how many days were these difficulties present? Record number of days 5   H2 In the past 30 days, for how many days were you totally unable to carry out your usual activities or work because of any health condition? Record number of days  0   H3 In the past 30 days, not counting the days that you were totally unable, for how many days did you cut back or reduce your usual activities or work because of any health condition? Record number of days 0          Referral / Collaboration:  Referral to another professional/service is not indicated at this time..    Anticipated number of session or this episode of care: 8-12      MeasurableTreatment Goal(s) related to diagnosis / functional impairment(s)  Goal 1: Client will resolve core conflict preventing him from experiencing a healthy and satisfying relationship with his wife.    I will know I've met my goal when I can be truly vulnerable but safe with my spouse.      Objective #A (Client Action)    Client will track and record at least 10 pleasant exchanges with partner.  Status: New - Date: 3/20/18     Intervention(s)  Therapist will teach emotional recognition/identification. -.  Therapist will teach emotional regulation skills. -.  Therapist will teach about healthy boundaries. -.      Client has reviewed and agreed to the above plan.      ADA Hennessy  March 20, 2018

## 2018-06-06 ENCOUNTER — OFFICE VISIT (OUTPATIENT)
Dept: PSYCHOLOGY | Facility: CLINIC | Age: 38
End: 2018-06-06
Payer: COMMERCIAL

## 2018-06-06 DIAGNOSIS — F43.23 ADJUSTMENT DISORDER WITH MIXED ANXIETY AND DEPRESSED MOOD: Primary | ICD-10-CM

## 2018-06-06 PROCEDURE — 90834 PSYTX W PT 45 MINUTES: CPT | Performed by: MARRIAGE & FAMILY THERAPIST

## 2018-06-06 NOTE — PROGRESS NOTES
Progress Note    Client Name: Howie Yang  Date: 6/6/18         Service Type: Individual      Session Start Time: 12:15 pm  Session End Time: 12:58 pm      Session Length: 43 minutes     Session #: 8     Attendees: Client attended alone    Treatment Plan Last Reviewed: 3/20/18  PHQ-9 / MARYANNE-7 : 2 / 0     DATA      Progress Since Last Session (Related to Symptoms / Goals / Homework):   Symptoms: Stable    Homework: none given      Episode of Care Goals: Minimal progress - MAINTENANCE (Working to maintain change, with risk of relapse); Intervened by continuing to positively reinforce healthy behavior choice      Current / Ongoing Stressors and Concerns:   Was in marriage counseling with wife for parenting differences, but has moved to doing individual therapy because of a sexual addiction coming into the light for them as a couple that client wants to work through on his own before finishing marriage counseling.      Treatment Objective(s) Addressed in This Session:   track and record at least 10 pleasant exchanges with partner       Intervention:   Used CBT cognitive restructuring to assist client in changes his automatic negative thoughts about fears of his children dying. Explored the nature and meaning of these thoughts with client.        ASSESSMENT: Current Emotional / Mental Status (status of significant symptoms):   Risk status (Self / Other harm or suicidal ideation)   Client denies current fears or concerns for personal safety.   Client denies current or recent suicidal ideation or behaviors.   Client denies current or recent homicidal ideation or behaviors.   Client denies current or recent self injurious behavior or ideation.   Client denies other safety concerns.   A safety and risk management plan has not been developed at this time, however client was given the after-hours number / 911 should there be a change in any of these risk  factors.     Appearance:   Appropriate    Eye Contact:   Good    Psychomotor Behavior: Normal    Attitude:   Cooperative    Orientation:   All   Speech    Rate / Production: Normal     Volume:  Normal    Mood:    Anxious  Sad    Affect:    Appropriate    Thought Content:  Clear    Thought Form:  Coherent  Logical    Insight:    Fair      Medication Review:   No current psychiatric medications prescribed     Medication Compliance:   NA     Changes in Health Issues:   None reported     Chemical Use Review:   Substance Use: Chemical use reviewed, no active concerns identified      Tobacco Use: No current tobacco use.       Collateral Reports Completed:   Not Applicable    PLAN: (Client Tasks / Therapist Tasks / Other)  Explore pattern of fearful thoughts related to children's safety.         Renaldo Cintron, ADA                                                         ________________________________________________________________________    Treatment Plan    Client's Name: Howie Yang  YOB: 1980    Date: 3/20/18    DSM-V Diagnoses: Adjustment Disorders  309.28 (F43.23) With mixed anxiety and depressed mood  Psychosocial / Contextual Factors: Was in marriage counseling with wife for parenting differences, but has moved to doing individual therapy because of a sexual addiction coming into the light for them as a couple that client wants to work through on his own before finishing marriage counseling.     WHODAS:     S1 Standing for long periods such as 30 minutes? Mild =           2   S2 Taking care of household responsibilities? None =         1   S3 Learning a new task, for example, learning how to get to a new place? Mild =           2   S4 How much of a problem do you have joining community activities (for example, festivals, Taoism or other activities) in the same way as anyone else can? None =         1   S5 How much have you been emotionally affected by your health problems? Moderate =   3            In the past 30 days, how much difficulty did you have in:   S6 Concentrating on doing something for ten minutes? None =         1   S7 Walking a long distance such as a kilometer (or equivalent)? Mild =           2   S8 Washing your whole body? None =         1   S9 Getting dressed? None =         1   S10 Dealing with people you do not know? None =         1   S11 Maintaining a friendship? None =         1   S12 Your day to day work? None =         1      H1 Overall, in the past 30 days, how many days were these difficulties present? Record number of days 5   H2 In the past 30 days, for how many days were you totally unable to carry out your usual activities or work because of any health condition? Record number of days  0   H3 In the past 30 days, not counting the days that you were totally unable, for how many days did you cut back or reduce your usual activities or work because of any health condition? Record number of days 0          Referral / Collaboration:  Referral to another professional/service is not indicated at this time..    Anticipated number of session or this episode of care: 8-12      MeasurableTreatment Goal(s) related to diagnosis / functional impairment(s)  Goal 1: Client will resolve core conflict preventing him from experiencing a healthy and satisfying relationship with his wife.    I will know I've met my goal when I can be truly vulnerable but safe with my spouse.      Objective #A (Client Action)    Client will track and record at least 10 pleasant exchanges with partner.  Status: New - Date: 3/20/18     Intervention(s)  Therapist will teach emotional recognition/identification. -.  Therapist will teach emotional regulation skills. -.  Therapist will teach about healthy boundaries. -.      Client has reviewed and agreed to the above plan.      ADA Hennessy  March 20, 2018

## 2018-06-06 NOTE — MR AVS SNAPSHOT
MRN:[id number]                      After Visit Summary   6/6/2018    Howie Yang    MRN: [id number]           Visit Information        Provider Department      6/6/2018 12:00 PM Renaldo Cintron LMFT [facility] Generic      Your next 10 appointments already scheduled     Jun 20, 2018 12:00 PM CDT   Return Visit with ADA Cowan   [facility] ([facility])    [address] [place] [id number] [phone]              Care EveryWhere ID     This is your Care EveryWhere ID. This could be used by other organizations to access your [place] medical records  [id number]        Equal Access to Services     TRUE HALE : Hadii jose guadalupe Henderson, wagraceda bijan, qaybta kaalmada kenya, rishi koehler. So [facility] [phone].    ATENCIÓN: Si habla español, tiene a britton disposición servicios gratuitos de asistencia lingüística. Llame al [phone].    We comply with applicable federal civil rights laws and [place] laws. We do not discriminate on the basis of race, color, national origin, age, disability, sex, sexual orientation, or gender identity.

## 2018-07-19 ENCOUNTER — DOCUMENTATION ONLY (OUTPATIENT)
Dept: SLEEP MEDICINE | Facility: CLINIC | Age: 38
End: 2018-07-19
Payer: COMMERCIAL

## 2018-07-19 NOTE — PROGRESS NOTES
6 month [facility] Recheck Visit     Diagnostic AHI: 29.5     Data only recheck     Assessment: Pt meeting objective benchmarks.  Patient compliance is 88% the last 180 days.   Action plan:   Pt to follow up per provider request (1-2 yrs)       Device type: Auto-CPAP  PAP settings: CPAP min 10.0 cm  H20     CPAP max 16.0 cm  H20    95th% pressure 11.9 cm  H20   Objective measures: 14 day rolling measures      Compliance  100 %      Leak  16.64 lpm  last  upload      AHI 0.33   last  upload      Average number of minutes 338      Objective measure goal  Compliance   Goal >70%  Leak   Goal < 24 lpm  AHI  Goal < 5  Usage  Goal >240

## 2018-08-06 ENCOUNTER — FCC EXTENDED DOCUMENTATION (OUTPATIENT)
Dept: PSYCHOLOGY | Facility: CLINIC | Age: 38
End: 2018-08-06

## 2018-08-06 NOTE — PROGRESS NOTES
Discharge Summary  Single Session    Client Name: Howie Yang MRN#: [id number] YOB: 1980      Intake / Discharge Date: 2-26-18/8-6-18      DSM5 Diagnoses: (Sustained by DSM5 Criteria Listed Above)  Diagnoses: Adjustment Disorders  309.28 (F43.23) With mixed anxiety and depressed mood  Psychosocial & Contextual Factors: Was in marriage counseling with wife for parenting differences, but has moved to doing individual therapy because of a sexual addiction coming into the light for them as a couple that client wants to work through on his own before finishing marriage counseling.   WHODAS 2.0 (12 item) Score: 17          Presenting Concern:  Marital problems and sexual addiction.       Reason for Discharge:  Client is satisfied with progress      Disposition at Time of Last Encounter:   Comments:   hapyp with all the changes     Risk Management:   Client denies a history of suicidal ideation, suicide attempts, self-injurious behavior, homicidal ideation, homicidal behavior and and other safety concerns  A safety and risk management plan has not been developed at this time, however client was given the after-hours number / 911 should there be a change in any of these risk factors.      Referred To:  discharged        ADA Hennessy   8/6/2018

## 2019-12-17 ENCOUNTER — ANCILLARY PROCEDURE (OUTPATIENT)
Dept: GENERAL RADIOLOGY | Facility: OTHER | Age: 39
End: 2019-12-17
Attending: PHYSICIAN ASSISTANT
Payer: COMMERCIAL

## 2019-12-17 ENCOUNTER — OFFICE VISIT (OUTPATIENT)
Dept: FAMILY MEDICINE | Facility: OTHER | Age: 39
End: 2019-12-17
Payer: COMMERCIAL

## 2019-12-17 VITALS
HEART RATE: 96 BPM | DIASTOLIC BLOOD PRESSURE: 82 MMHG | BODY MASS INDEX: 39.73 KG/M2 | OXYGEN SATURATION: 96 % | WEIGHT: 294 LBS | TEMPERATURE: 97.7 F | SYSTOLIC BLOOD PRESSURE: 130 MMHG

## 2019-12-17 DIAGNOSIS — G47.33 OSA (OBSTRUCTIVE SLEEP APNEA): ICD-10-CM

## 2019-12-17 DIAGNOSIS — R05.9 COUGH: ICD-10-CM

## 2019-12-17 DIAGNOSIS — J06.9 VIRAL URI WITH COUGH: Primary | ICD-10-CM

## 2019-12-17 DIAGNOSIS — R09.89 CHEST CONGESTION: ICD-10-CM

## 2019-12-17 DIAGNOSIS — J06.9 UPPER RESPIRATORY TRACT INFECTION, UNSPECIFIED TYPE: ICD-10-CM

## 2019-12-17 PROBLEM — E66.01 MORBID OBESITY (H): Status: ACTIVE | Noted: 2019-12-17

## 2019-12-17 LAB
ERYTHROCYTE [DISTWIDTH] IN BLOOD BY AUTOMATED COUNT: 12.6 % (ref 10–15)
HCT VFR BLD AUTO: 46.3 % (ref 40–53)
HGB BLD-MCNC: 15.8 G/DL (ref 13.3–17.7)
MCH RBC QN AUTO: 31.2 PG (ref 26.5–33)
MCHC RBC AUTO-ENTMCNC: 34.1 G/DL (ref 31.5–36.5)
MCV RBC AUTO: 91 FL (ref 78–100)
PLATELET # BLD AUTO: 203 10E9/L (ref 150–450)
RBC # BLD AUTO: 5.07 10E12/L (ref 4.4–5.9)
WBC # BLD AUTO: 5.9 10E9/L (ref 4–11)

## 2019-12-17 PROCEDURE — 85027 COMPLETE CBC AUTOMATED: CPT | Performed by: PHYSICIAN ASSISTANT

## 2019-12-17 PROCEDURE — 71046 X-RAY EXAM CHEST 2 VIEWS: CPT | Mod: FY

## 2019-12-17 PROCEDURE — 99213 OFFICE O/P EST LOW 20 MIN: CPT | Performed by: PHYSICIAN ASSISTANT

## 2019-12-17 PROCEDURE — 36415 COLL VENOUS BLD VENIPUNCTURE: CPT | Performed by: PHYSICIAN ASSISTANT

## 2019-12-17 RX ORDER — METHYLPREDNISOLONE 4 MG
TABLET, DOSE PACK ORAL
Qty: 21 TABLET | Refills: 0 | Status: SHIPPED | OUTPATIENT
Start: 2019-12-17 | End: 2020-06-19

## 2019-12-17 ASSESSMENT — PAIN SCALES - GENERAL: PAINLEVEL: NO PAIN (0)

## 2019-12-17 NOTE — PROGRESS NOTES
Subjective     Howie Yang is a 39 year old male who presents to clinic today for the following health issues:    HPI   Acute Illness   Acute illness concerns: URI  Onset: 10/31 Off and on     Fever: YES- Patient has not taken temperature    Chills/Sweats: YES    Headache (location?): YES    Sinus Pressure:YES- reddened, post-nasal drainage and facial pain    Conjunctivitis:  no    Ear Pain: no    Rhinorrhea: YES    Congestion: YES    Sore Throat: no     Cough: YES    Wheeze: no    Decreased Appetite: no    Nausea: no    Vomiting: no    Diarrhea:  YES    Dysuria/Freq.: no    Fatigue/Achiness: no    Sick/Strep Exposure: YES- Family     Therapies Tried and outcome: Cough medication    Patient Active Problem List   Diagnosis     CARDIOVASCULAR SCREENING; LDL GOAL LESS THAN 160     Adjustment disorder with mixed anxiety and depressed mood     AYANA (obstructive sleep apnea)     Obesity (BMI 35.0-39.9) with comorbidity (H)     Past Surgical History:   Procedure Laterality Date     VASECTOMY  2/4/2016       Social History     Tobacco Use     Smoking status: Former Smoker     Smokeless tobacco: Former User     Types: Snuff   Substance Use Topics     Alcohol use: No     Alcohol/week: 0.0 standard drinks     Family History   Problem Relation Age of Onset     Hypertension Father      Hypertension Brother          Current Outpatient Medications   Medication Sig Dispense Refill     IBUPROFEN PO Take 600 mg by mouth       order for DME Equipment ordered: RESMED Auto PAP Mask type: Full face  Settings: 5-15 CM H2O       guaiFENesin-codeine (ROBITUSSIN AC) 100-10 MG/5ML SOLN solution Take 5 mLs by mouth every 4 hours as needed for cough (Patient not taking: Reported on 3/14/2018) 240 mL 0     Allergies   Allergen Reactions     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      Objective    /82   Pulse 96   Temp 97.7  F (36.5  C) (Temporal)   Wt 133.4 kg (294 lb)   SpO2 96%   BMI 39.73 kg/m    Body mass index is 39.73 kg/m .  Physical Exam   GENERAL: healthy, alert and no distress  NECK: no adenopathy, no asymmetry, masses, or scars and thyroid normal to palpation  RESP: prolonged expiratory phase, decreased breath sounds throughout and harsh breath sounds noted.  CV: regular rate and rhythm, normal S1 S2, no S3 or S4, no murmur, click or rub, no peripheral edema and peripheral pulses strong  MS: no gross musculoskeletal defects noted, no edema  SKIN: no suspicious lesions or rashes to visible skin  NEURO: Normal strength and tone, mentation intact and speech normal  PSYCH: mentation appears normal, affect normal/bright    Diagnostic Test Results:  Labs reviewed in Epic  No results found for this or any previous visit (from the past 24 hour(s)).      X-ray: essentially negative for concerning pathology with exception of question of some pulmonary nodules right and laterally noted on the PA view to my independent review today. It will be overread by radiology.    Assessment & Plan     1. Viral URI with cough  2. Upper respiratory tract infection, unspecified type  3. Cough  4. Chest congestion  More than likely viral based on assessment physical exam and lab findings today.  Would treat as noted below and follow-up in 2 to 3 weeks.  - XR Chest 2 Views; Future  - CBC with platelets  - methylPREDNISolone (MEDROL DOSEPAK) 4 MG tablet therapy pack; Follow package directions.  Dispense: 21 tablet; Refill: 0    5. AYANA  (obstructive sleep apnea)  He has not been using the humidity on his CPAP machine I strongly recommend that he change this practice to rehydrate any airway drying that may be happening in the middle the night causing him to have #1-3 and 4.  Follow-up in 4 to 6 weeks advised.  - XR Chest 2 Views; Future  - CBC with platelets       Work on weight loss  Regular exercise    Return in about 3 weeks (around 1/7/2020) for Medication Recheck, recheck of current condition.    Zoran Winters PA-C  [facility]

## 2020-06-19 ENCOUNTER — VIRTUAL VISIT (OUTPATIENT)
Dept: SLEEP MEDICINE | Facility: CLINIC | Age: 40
End: 2020-06-19
Payer: COMMERCIAL

## 2020-06-19 DIAGNOSIS — G47.33 OSA (OBSTRUCTIVE SLEEP APNEA): Primary | ICD-10-CM

## 2020-06-19 PROCEDURE — 99213 OFFICE O/P EST LOW 20 MIN: CPT | Mod: 95 | Performed by: PHYSICIAN ASSISTANT

## 2020-06-19 NOTE — PROGRESS NOTES
"Howie Yang is a 40 year old male who is being evaluated via a billable telephone visit.      The patient has been notified of following:     \"This telephone visit will be conducted via a call between you and your physician/provider. We have found that certain health care needs can be provided without the need for a physical exam.  This service lets us provide the care you need with a short phone conversation.  If a prescription is necessary we can send it directly to your pharmacy.  If lab work is needed we can place an order for that and you can then stop by our lab to have the test done at a later time.    Telephone visits are billed at different rates depending on your insurance coverage. During this emergency period, for some insurers they may be billed the same as an in-person visit.  Please reach out to your insurance provider with any questions.    If during the course of the call the physician/provider feels a telephone visit is not appropriate, you will not be charged for this service.\"    Patient has given verbal consent for Telephone visit?  Yes    What phone number would you like to be contacted at? [phone]    How would you like to obtain your AVS? Mail a copy    Phone call duration: 15 minutes    Deshawn Fitzgerald PA-C     Does Howie have a CPAP/Bipap?  Yes             Type of mask: full     [facility]: [place] [phone]    [email or web address]     [place] Sleep Scale: 4       Obstructive Sleep Apnea - PAP Follow-Up Visit:    Chief Complaint   Patient presents with     CPAP Follow Up       Howie Yang comes in today for follow-up of their severe sleep apnea, managed with CPAP.     No specialty comments available.    Overall, he rates the experience with PAP as 9 (0 poor, 10 great). The mask is comfortable.    The mask is not leaking   He is not snoring with the mask on. He is not having gasp arousals.  He is not having significant oral/nasal " dryness. The pressure is comfortable.     His PAP interface is Full Face Mask.    Bedtime is typically 11 PM. Usually it takes about 5 minutes to fall asleep with the mask on. Wake time is typically 6:30.  Patient is using PAP therapy 7 hours per night. The patient is usually getting 7 hours of sleep per night.    He does feel rested in the morning.    [place] Sleepiness Scale: 4/24      No data recorded    ResMed   CPAP  cmH2O 30 day usage data:  100% of days with > 4 hours of use. 0/30 days with no use.   Average use 411 minutes per day.   95%ile Leak 9.87 L/min.   AHI 1.26 events per hour.     Auto-PAP 10.0 - 16.0 cmH2O 30 day usage data:    100% of days with > 4 hours of use. 0/30 days with no use.   Average use 411 minutes per day.   95%ile Leak 9.87 L/min.   CPAP 95% pressure 14.8 cm.   AHI 1.26 events per hour.         Past medical/surgical history, family history, social history, medications and allergies were reviewed.      Problem List:  Patient Active Problem List    Diagnosis Date Noted     AYANA (obstructive sleep apnea) 12/17/2019     Priority: Medium     Obesity (BMI 35.0-39.9) with comorbidity (H) 12/17/2019     Priority: Medium     Adjustment disorder with mixed anxiety and depressed mood 03/20/2018     Priority: Medium     CARDIOVASCULAR SCREENING; LDL GOAL LESS THAN 160 10/31/2010     Priority: Medium        There were no vitals taken for this visit.    Impression/Plan:    Moderate Sleep apnea. He is Tolerating PAP well. Daytime symptoms are stable. Supplies reordered      Howie Yang will follow up in about 1 year(s).     Fifteen minutes spent with patient, all of which were spent face-to-face counseling, consulting, coordinating plan of care.      CC:  Tian Badillo,

## 2020-07-14 ENCOUNTER — HOSPITAL ENCOUNTER (OUTPATIENT)
Dept: GENERAL RADIOLOGY | Facility: CLINIC | Age: 40
Discharge: HOME | End: 2020-07-14
Attending: FAMILY MEDICINE | Admitting: FAMILY MEDICINE
Payer: COMMERCIAL

## 2020-07-14 ENCOUNTER — OFFICE VISIT (OUTPATIENT)
Dept: FAMILY MEDICINE | Facility: CLINIC | Age: 40
End: 2020-07-14
Payer: COMMERCIAL

## 2020-07-14 ENCOUNTER — TELEPHONE (OUTPATIENT)
Dept: FAMILY MEDICINE | Facility: CLINIC | Age: 40
End: 2020-07-14

## 2020-07-14 VITALS
HEART RATE: 85 BPM | BODY MASS INDEX: 37.43 KG/M2 | RESPIRATION RATE: 14 BRPM | SYSTOLIC BLOOD PRESSURE: 118 MMHG | WEIGHT: 277 LBS | TEMPERATURE: 98.6 F | DIASTOLIC BLOOD PRESSURE: 78 MMHG | OXYGEN SATURATION: 94 %

## 2020-07-14 DIAGNOSIS — M25.562 ACUTE PAIN OF LEFT KNEE: Primary | ICD-10-CM

## 2020-07-14 DIAGNOSIS — M25.562 ACUTE PAIN OF LEFT KNEE: ICD-10-CM

## 2020-07-14 LAB
BASOPHILS # BLD AUTO: 0 10E9/L (ref 0–0.2)
BASOPHILS NFR BLD AUTO: 0.5 %
CRP SERPL-MCNC: <2.9 MG/L (ref 0–8)
DIFFERENTIAL METHOD BLD: NORMAL
EOSINOPHIL NFR BLD AUTO: 3.6 %
ERYTHROCYTE [DISTWIDTH] IN BLOOD BY AUTOMATED COUNT: 12.3 % (ref 10–15)
HCT VFR BLD AUTO: 47.1 % (ref 40–53)
HGB BLD-MCNC: 15.9 G/DL (ref 13.3–17.7)
IMM GRANULOCYTES # BLD: 0.1 10E9/L (ref 0–0.4)
IMM GRANULOCYTES NFR BLD: 1 %
LYMPHOCYTES # BLD AUTO: 1.7 10E9/L (ref 0.8–5.3)
LYMPHOCYTES NFR BLD AUTO: 29.3 %
MCH RBC QN AUTO: 31.2 PG (ref 26.5–33)
MCHC RBC AUTO-ENTMCNC: 33.8 G/DL (ref 31.5–36.5)
MCV RBC AUTO: 93 FL (ref 78–100)
MONOCYTES # BLD AUTO: 0.6 10E9/L (ref 0–1.3)
MONOCYTES NFR BLD AUTO: 10.2 %
NEUTROPHILS # BLD AUTO: 3.2 10E9/L (ref 1.6–8.3)
NEUTROPHILS NFR BLD AUTO: 55.4 %
NRBC # BLD AUTO: 0 10*3/UL
NRBC BLD AUTO-RTO: 0 /100
PLATELET # BLD AUTO: 202 10E9/L (ref 150–450)
RBC # BLD AUTO: 5.09 10E12/L (ref 4.4–5.9)
URATE SERPL-MCNC: 5.3 MG/DL (ref 3.5–7.2)
WBC # BLD AUTO: 5.8 10E9/L (ref 4–11)

## 2020-07-14 PROCEDURE — 36415 COLL VENOUS BLD VENIPUNCTURE: CPT | Performed by: FAMILY MEDICINE

## 2020-07-14 PROCEDURE — 86140 C-REACTIVE PROTEIN: CPT | Performed by: FAMILY MEDICINE

## 2020-07-14 PROCEDURE — 73564 X-RAY EXAM KNEE 4 OR MORE: CPT | Mod: TC

## 2020-07-14 PROCEDURE — 99213 OFFICE O/P EST LOW 20 MIN: CPT | Performed by: FAMILY MEDICINE

## 2020-07-14 PROCEDURE — 85025 COMPLETE CBC W/AUTO DIFF WBC: CPT | Performed by: FAMILY MEDICINE

## 2020-07-14 PROCEDURE — 84550 ASSAY OF BLOOD/URIC ACID: CPT | Performed by: FAMILY MEDICINE

## 2020-07-14 ASSESSMENT — PAIN SCALES - GENERAL: PAINLEVEL: MODERATE PAIN (5)

## 2020-07-14 NOTE — TELEPHONE ENCOUNTER
----- Message from Humaira Thompson Mai, MD sent at 7/14/2020  4:48 PM CDT -----  Please call with results.  The CRP level was normal, which suggest of no active inflammation.  Uric acid level was normal, less likely but not rule out completely of gout.  CBC was normal, no indication of infection.  Still waiting for the x-ray results, will let notify when it is available.     Humaira Reyes MD.

## 2020-07-14 NOTE — PROGRESS NOTES
Subjective     Howie Yang is a 40 year old male who presents to clinic today for the following health issues:    HPI   Chief Complaint   Patient presents with     Knee Injury     x 2 days, left knee, states that there wasn't any injury woke up with the pain.      Howie is here today for the left knee pain.  Stated that he woke up with the pain couple days ago.  He was feeling fine before he went to bed and did not do anything usual the day before.  Was in a lot of pain when he first get up, but then feeling little better as the day went on with walking.  The pain got worse again toward the end of the day.  Last night, he woke up with severe pain, but is not too bad today.  Throbbing pain 4/10 when stand and 8/10 when walking.  Tylenol and ibuprofen with minimal effect.  Icing seems to help some.  No redness or swelling.  No unusual activity or recent history of trauma.  Never had it before and no history of knee injury.  No history of gout and denied of STD.  No hip or back pain.  No numbness or tingling sensation.  No weakness. No fever or chill.  No other concern.    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      Objective    /78   Pulse 85   Temp 98.6  F (37  C) (Temporal)   Resp 14   Wt 125.6 kg (277 lb)   SpO2 94%   BMI 37.43 kg/m    Body mass index is 37.43 kg/m .  Physical Exam   GENERAL: healthy, alert and no distress  RESP: lungs clear to auscultation - no rales, rhonchi or wheezes  CV: regular rate and rhythm, no murmur.  MS: no gross musculoskeletal defects noted, no edema.   Walk without limping.  Both legs are equally in strength.  Hips and ankle exams were normal. Left knee with no effusion or redness.  Tender with palpation the medical aspect of the knee and patella. No crepitus. No tender with palpation to the condyle.  Negative anterior and posterior draw test.  Negative Latchmen's test. Collateral ligamen exam was stable.      Diagnostic Test Results:  Labs reviewed in Epic  Results for orders placed or performed during the hospital encounter of 07/14/20   XR Knee Left G/E 4 Views     Status: None    Narrative    KNEE LEFT FOUR OR MORE VIEWS July 14, 2020 12:45 PM     HISTORY: Acute pain of left knee.    COMPARISON: Right (contralateral) knee x-rays dated 1/10/2016.    FINDINGS: There is no fracture or significant joint effusion. Joint  spaces are well maintained.       Impression    IMPRESSION: Negative left knee x-rays.     MADELINE DENNISON MD   Results for orders placed or performed in visit on 07/14/20   CBC with platelets and differential     Status: None   Result Value Ref Range    WBC 5.8 4.0 - 11.0 10e9/L    RBC Count 5.09 4.4 - 5.9 10e12/L    Hemoglobin 15.9 13.3 - 17.7 g/dL    Hematocrit 47.1 40.0 - 53.0 %    MCV 93 78 - 100 fl    MCH 31.2 26.5 - 33.0 pg    MCHC 33.8 31.5 - 36.5 g/dL    RDW 12.3 10.0 - 15.0 %    Platelet Count 202 150 - 450 10e9/L    Diff Method Automated Method     % Neutrophils 55.4 %    % Lymphocytes 29.3 %    % Monocytes 10.2 %    % Eosinophils 3.6 %    % Basophils 0.5 %    % Immature Granulocytes 1.0 %    Nucleated RBCs 0 0 /100    Absolute Neutrophil 3.2 1.6 - 8.3 10e9/L    Absolute Lymphocytes 1.7 0.8 - 5.3 10e9/L    Absolute Monocytes 0.6 0.0 - 1.3 10e9/L    Absolute Basophils 0.0 0.0 - 0.2 10e9/L    Abs Immature Granulocytes 0.1 0 - 0.4 10e9/L    Absolute Nucleated RBC 0.0    CRP, inflammation     Status: None   Result Value Ref Range    CRP Inflammation <2.9 0.0 - 8.0 mg/L   Uric acid     Status: None   Result Value Ref Range    Uric Acid 5.3 3.5  - 7.2 mg/dL           Assessment & Plan       ICD-10-CM    1. Acute pain of left knee  M25.562 CBC with platelets and differential     CRP, inflammation     Uric acid     CANCELED: LT X-RAY KNEE 4+ VIEW     Acute onset with no trauma or konwn triggering factor.  Exam was pretty normal except of the tenderness with palpation to the medial aspect of the knee.  No effusion, redness nor was warmth to touch.  No history of knee injury.  Unclear etiology, but most likely due to strain.  Due to the lack of injury/trauma, unlikely meniscal or ligament tear.  Physical exam did not suggest of gouty arthritis.  No effusion or sign of infection/inflammation, unlikely it is gonorrhea arthritis.  Sent for the knee x-ray which was normal.  Also checked for CBC, CMP and uric acid level and they all were normal.  Encouraged him to continue with Tylenol/Motrin as needed for pain.  Continue with his normal activities as tolerated.  Call in or follow-up if is not getting better or is getting worse in the next several days.      Return in about 1 month (around 8/14/2020) for Physical Exam.    Humaira Thompson Mai, MD  [facility]

## 2020-07-14 NOTE — TELEPHONE ENCOUNTER
I left a message asking patient to return our call.  Please inform patient of the message below.  Evie Jean, CMA

## 2020-07-15 NOTE — TELEPHONE ENCOUNTER
The patient called back and information has been given.   Thank you,  Regina Lopez   for [facility]

## 2020-10-27 ENCOUNTER — VIRTUAL VISIT (OUTPATIENT)
Dept: FAMILY MEDICINE | Facility: CLINIC | Age: 40
End: 2020-10-27
Payer: COMMERCIAL

## 2020-10-27 DIAGNOSIS — N52.9 ERECTILE DYSFUNCTION, UNSPECIFIED ERECTILE DYSFUNCTION TYPE: Primary | ICD-10-CM

## 2020-10-27 PROCEDURE — 99213 OFFICE O/P EST LOW 20 MIN: CPT | Mod: 95 | Performed by: FAMILY MEDICINE

## 2020-10-27 RX ORDER — SILDENAFIL 50 MG/1
25-50 TABLET, FILM COATED ORAL DAILY PRN
Qty: 5 TABLET | Refills: 3 | Status: SHIPPED | OUTPATIENT
Start: 2020-10-27 | End: 2023-12-12

## 2020-10-27 NOTE — PROGRESS NOTES
"Howie Yang is a 40 year old male  who is being evaluated via a billable video visit.      The patient has been notified of following:     \"This video visit will be conducted via a call between you and your physician/provider. We have found that certain health care needs can be provided without the need for an in-person physical exam.  This service lets us provide the care you need with a video conversation.  If a prescription is necessary we can send it directly to your pharmacy.  If lab work is needed we can place an order for that and you can then stop by our lab to have the test done at a later time.    Video visits are billed at different rates depending on your insurance coverage.  Please reach out to your insurance provider with any questions.    If during the course of the call the physician/provider feels a video visit is not appropriate, you will not be charged for this service.\"    Patient has given verbal consent for Video visit? Yes    How would you like to obtain your AVS? Mail a copy    Patient would like the video invitation sent by:     Will anyone else be joining your video visit? No      Subjective       HPI  ED    Several months of very minor erections. Perhaps 50%. Plenty of sex drive. Weight is up. Had a porn issue. Marriage going very well      Reviewed and updated as needed this visit by Provider       Review of Systems         Objective      Physical Exam         Diagnostic Test Results:  Labs reviewed in Epic        Assessment & Plan       ICD-10-CM    1. Erectile dysfunction, unspecified erectile dysfunction type  N52.9 sildenafil (VIAGRA) 50 MG tablet       ED,. Trial of viagra,. Call with results in 1 mo      Telephone visit: 12min  Tian Badillo MD           "

## 2021-07-13 LAB
ALT SERPL-CCNC: 63 U/L (ref 9–46)
AST SERPL-CCNC: 38 U/L (ref 10–40)
CHOLESTEROL (EXTERNAL): 165 MG/DL
CREATININE (EXTERNAL): 0.88 MG/DL (ref 0.6–1.35)
GFR ESTIMATED (EXTERNAL): 105 ML/MIN/1.73M2
GFR ESTIMATED (IF AFRICAN AMERICAN) (EXTERNAL): 122 ML/MIN/1.73M2
GLUCOSE (EXTERNAL): 85 MG/DL (ref 65–99)
HDLC SERPL-MCNC: 37 MG/DL
LDL CHOLESTEROL (EXTERNAL): 85 MG/DL
NON HDL CHOLESTEROL (EXTERNAL): 128 MG/DL
POTASSIUM (EXTERNAL): 4.2 MMOL/L (ref 3.5–5.3)
TRIGLYCERIDES (EXTERNAL): 360 MG/DL
TSH SERPL-ACNC: 2.89 MIU/L (ref 0.4–4.5)

## 2021-10-07 ENCOUNTER — HOSPITAL ENCOUNTER (EMERGENCY)
Facility: CLINIC | Age: 41
Discharge: HOME | End: 2021-10-07
Attending: FAMILY MEDICINE | Admitting: FAMILY MEDICINE
Payer: COMMERCIAL

## 2021-10-07 VITALS
SYSTOLIC BLOOD PRESSURE: 162 MMHG | WEIGHT: 297 LBS | DIASTOLIC BLOOD PRESSURE: 108 MMHG | BODY MASS INDEX: 40.14 KG/M2 | TEMPERATURE: 98.1 F | OXYGEN SATURATION: 96 % | HEART RATE: 91 BPM

## 2021-10-07 DIAGNOSIS — I10 ESSENTIAL HYPERTENSION: ICD-10-CM

## 2021-10-07 LAB
ALBUMIN SERPL-MCNC: 3.9 G/DL (ref 3.4–5)
ALP SERPL-CCNC: 102 U/L (ref 40–150)
ALT SERPL W P-5'-P-CCNC: 76 U/L (ref 0–70)
ANION GAP SERPL CALCULATED.3IONS-SCNC: 7 MMOL/L (ref 3–14)
AST SERPL W P-5'-P-CCNC: 34 U/L (ref 0–45)
BASOPHILS # BLD AUTO: 0 10E3/UL (ref 0–0.2)
BASOPHILS NFR BLD AUTO: 0 %
BILIRUB SERPL-MCNC: 0.3 MG/DL (ref 0.2–1.3)
BUN SERPL-MCNC: 16 MG/DL (ref 7–30)
CALCIUM SERPL-MCNC: 8.2 MG/DL (ref 8.5–10.1)
CHLORIDE BLD-SCNC: 108 MMOL/L (ref 94–109)
CO2 SERPL-SCNC: 25 MMOL/L (ref 20–32)
CREAT SERPL-MCNC: 1.01 MG/DL (ref 0.66–1.25)
EOSINOPHIL # BLD AUTO: 0.2 10E3/UL (ref 0–0.7)
EOSINOPHIL NFR BLD AUTO: 3 %
ERYTHROCYTE [DISTWIDTH] IN BLOOD BY AUTOMATED COUNT: 11.9 % (ref 10–15)
GFR SERPL CREATININE-BSD FRML MDRD: >90 ML/MIN/1.73M2
GLUCOSE BLD-MCNC: 114 MG/DL (ref 70–99)
HCT VFR BLD AUTO: 45.7 % (ref 40–53)
HGB BLD-MCNC: 16.2 G/DL (ref 13.3–17.7)
IMM GRANULOCYTES # BLD: 0 10E3/UL
IMM GRANULOCYTES NFR BLD: 1 %
LYMPHOCYTES # BLD AUTO: 2.3 10E3/UL (ref 0.8–5.3)
LYMPHOCYTES NFR BLD AUTO: 31 %
MCH RBC QN AUTO: 32.1 PG (ref 26.5–33)
MCHC RBC AUTO-ENTMCNC: 35.4 G/DL (ref 31.5–36.5)
MCV RBC AUTO: 91 FL (ref 78–100)
MONOCYTES # BLD AUTO: 0.6 10E3/UL (ref 0–1.3)
MONOCYTES NFR BLD AUTO: 9 %
NEUTROPHILS # BLD AUTO: 4.2 10E3/UL (ref 1.6–8.3)
NEUTROPHILS NFR BLD AUTO: 56 %
NRBC # BLD AUTO: 0 10E3/UL
NRBC BLD AUTO-RTO: 0 /100
PLATELET # BLD AUTO: 207 10E3/UL (ref 150–450)
POTASSIUM BLD-SCNC: 3.8 MMOL/L (ref 3.4–5.3)
PROT SERPL-MCNC: 7.8 G/DL (ref 6.8–8.8)
RBC # BLD AUTO: 5.04 10E6/UL (ref 4.4–5.9)
SODIUM SERPL-SCNC: 140 MMOL/L (ref 133–144)
WBC # BLD AUTO: 7.3 10E3/UL (ref 4–11)

## 2021-10-07 PROCEDURE — 36415 COLL VENOUS BLD VENIPUNCTURE: CPT | Performed by: FAMILY MEDICINE

## 2021-10-07 PROCEDURE — 99284 EMERGENCY DEPT VISIT MOD MDM: CPT | Performed by: FAMILY MEDICINE

## 2021-10-07 PROCEDURE — 85025 COMPLETE CBC W/AUTO DIFF WBC: CPT | Performed by: FAMILY MEDICINE

## 2021-10-07 PROCEDURE — 99284 EMERGENCY DEPT VISIT MOD MDM: CPT | Mod: 25 | Performed by: FAMILY MEDICINE

## 2021-10-07 PROCEDURE — 82040 ASSAY OF SERUM ALBUMIN: CPT | Performed by: FAMILY MEDICINE

## 2021-10-07 PROCEDURE — 93010 ELECTROCARDIOGRAM REPORT: CPT | Performed by: FAMILY MEDICINE

## 2021-10-07 PROCEDURE — 93005 ELECTROCARDIOGRAM TRACING: CPT | Performed by: FAMILY MEDICINE

## 2021-10-07 RX ORDER — AMLODIPINE BESYLATE 5 MG/1
5 TABLET ORAL DAILY
Qty: 30 TABLET | Refills: 0 | Status: SHIPPED | OUTPATIENT
Start: 2021-10-07 | End: 2021-11-06

## 2021-10-08 NOTE — ED PROVIDER NOTES
"  History     Chief Complaint   Patient presents with     Hypertension     HPI  Howie Yang is a 41 year old male who presents to the emergency department with \"high blood pressure\".  The patient took out his blood pressure cuff that he has not used in years and decided to check his blood pressure.  It was in the 160/100 150/90 range.  He states he is feeling fine.  He specifically denies any headache.  No chest pain or shortness of breath dizziness weakness or dyspnea on exertion.  He has no history of kidney disease.  He quit drinking alcohol 12 years ago.  He does not smoke.  He uses no other drugs.  He was on a blood pressure medication but okay was taken off of it when he started exercising had and had lost some weight.  Since then however his weight has increased again.  He has obstructive sleep apnea.  He has been a little more anxious and stressed lately.  He has had no unexpected weight loss.  No night sweats.  No history of thyroid disease.  No palpitations or racing heart.    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]      No Known Drug Allergies        Problem List:    Patient Active Problem List    Diagnosis Date Noted     AYANA (obstructive sleep apnea) 12/17/2019     Priority: Medium     Obesity (BMI 35.0-39.9) with comorbidity (H) 12/17/2019     Priority: Medium     Adjustment disorder with mixed anxiety and depressed mood 03/20/2018     Priority: Medium     CARDIOVASCULAR SCREENING; LDL GOAL LESS THAN 160 10/31/2010     Priority: Medium        Past Medical History:    Past Medical History:   Diagnosis Date     NO ACTIVE PROBLEMS        Past Surgical History:    Past Surgical History:   Procedure Laterality Date     VASECTOMY  2/4/2016       Family History:    Family History   Problem Relation Age of Onset     Hypertension Father      Hypertension Brother        Social History:  Marital Status:   [2]  Social History     Tobacco Use     Smoking status: " Former Smoker     Smokeless tobacco: Former User     Types: Snuff   Substance Use Topics     Alcohol use: No     Alcohol/week: 0.0 standard drinks     Drug use: No        Medications:    amLODIPine (NORVASC) 5 MG tablet  order for DME  sildenafil (VIAGRA) 50 MG tablet          Review of Systems   All other systems reviewed and are negative.      Physical Exam   BP: (!) 180/135  Pulse: 92  Temp: 98.1  F (36.7  C)  Weight: 134.7 kg (297 lb)  SpO2: 96 %      Physical Exam  Vitals and nursing note reviewed.   Constitutional:       Appearance: He is obese.      Comments: Alert bright pleasant smiling obese 41-year-old who appears in no distress or any discomfort.  He is hypertensive.BP (!) 180/135   Pulse 92   Temp 98.1  F (36.7  C) (Temporal)   Wt 134.7 kg (297 lb)   SpO2 96%   BMI 40.14 kg/m       HENT:      Head: Normocephalic and atraumatic.      Right Ear: Tympanic membrane, ear canal and external ear normal.      Left Ear: Tympanic membrane, ear canal and external ear normal.      Nose: Nose normal.      Mouth/Throat:      Mouth: Mucous membranes are moist.   Eyes:      Conjunctiva/sclera: Conjunctivae normal.   Cardiovascular:      Rate and Rhythm: Normal rate and regular rhythm.      Pulses: Normal pulses.      Heart sounds: Normal heart sounds.   Pulmonary:      Effort: Pulmonary effort is normal.      Breath sounds: Normal breath sounds.   Abdominal:      General: Abdomen is flat.   Musculoskeletal:         General: Normal range of motion.      Cervical back: Normal range of motion and neck supple.   Skin:     General: Skin is warm and dry.      Capillary Refill: Capillary refill takes less than 2 seconds.   Neurological:      General: No focal deficit present.      Mental Status: He is alert and oriented to person, place, and time.   Psychiatric:         Mood and Affect: Mood normal.         ED Course          EKG Interpretation:      Interpreted by Ruel Howard MD   Symptoms at time of EKG: None    Rhythm: Normal sinus   Rate: Normal  Axis: Normal  Ectopy: None  Conduction: Normal  ST Segments/ T Waves: No ST-T wave changes and No acute ischemic changes  Q Waves: None  Comparison to prior: Unchanged    Clinical Impression: normal EKG--normal sinus rhythm at a rate of 97.  No acute ST-T changes.         Procedures              Critical Care time:  none               Results for orders placed or performed during the hospital encounter of 10/07/21 (from the past 24 hour(s))   CBC with platelets differential    Narrative    The following orders were created for panel order CBC with platelets differential.  Procedure                               Abnormality         Status                     ---------                               -----------         ------                     CBC with platelets and d...[[id number]]                      Final result                 Please view results for these tests on the individual orders.   Comprehensive metabolic panel   Result Value Ref Range    Sodium 140 133 - 144 mmol/L    Potassium 3.8 3.4 - 5.3 mmol/L    Chloride 108 94 - 109 mmol/L    Carbon Dioxide (CO2) 25 20 - 32 mmol/L    Anion Gap 7 3 - 14 mmol/L    Urea Nitrogen 16 7 - 30 mg/dL    Creatinine 1.01 0.66 - 1.25 mg/dL    Calcium 8.2 (L) 8.5 - 10.1 mg/dL    Glucose 114 (H) 70 - 99 mg/dL    Alkaline Phosphatase 102 40 - 150 U/L    AST 34 0 - 45 U/L    ALT 76 (H) 0 - 70 U/L    Protein Total 7.8 6.8 - 8.8 g/dL    Albumin 3.9 3.4 - 5.0 g/dL    Bilirubin Total 0.3 0.2 - 1.3 mg/dL    GFR Estimate >90 >60 mL/min/1.73m2   CBC with platelets and differential   Result Value Ref Range    WBC Count 7.3 4.0 - 11.0 10e3/uL    RBC Count 5.04 4.40 - 5.90 10e6/uL    Hemoglobin 16.2 13.3 - 17.7 g/dL    Hematocrit 45.7 40.0 - 53.0 %    MCV 91 78 - 100 fL    MCH 32.1 26.5 - 33.0 pg    MCHC 35.4 31.5 - 36.5 g/dL    RDW 11.9 10.0 - 15.0 %    Platelet Count 207 150 - 450 10e3/uL    % Neutrophils 56 %    % Lymphocytes 31 %    % Monocytes 9  %    % Eosinophils 3 %    % Basophils 0 %    % Immature Granulocytes 1 %    NRBCs per 100 WBC 0 <1 /100    Absolute Neutrophils 4.2 1.6 - 8.3 10e3/uL    Absolute Lymphocytes 2.3 0.8 - 5.3 10e3/uL    Absolute Monocytes 0.6 0.0 - 1.3 10e3/uL    Absolute Eosinophils 0.2 0.0 - 0.7 10e3/uL    Absolute Basophils 0.0 0.0 - 0.2 10e3/uL    Absolute Immature Granulocytes 0.0 <=0.0 10e3/uL    Absolute NRBCs 0.0 10e3/uL       Medications - No data to display    Assessments & Plan (with Medical Decision Making)   MDM--41-year-old who presents to the emergency room as he broke out his blood pressure cuff and decided to check his blood pressure today.  He was 180/135 on arrival here.  It improved to 140/90 prior to discharge without any treatment in the ED.  He is asymptomatic.  He has no symptoms of any endorgan compromise.  His blood work is okay.  His EKG is normal sinus rhythm with no acute ST-T changes.  There is no recent tobacco alcohol or drug use.  The patient is morbidly obese.  As it is taking several weeks to get into the clinic I did agree to start him on an antihypertensive and recommended we start him on amlodipine 5 mg a day.  He should call at the clinic tomorrow and make an appointment in 2 to 3 weeks for follow-up.  He can check his blood pressure at home and I recommended the same time a day and just once a day for the next several weeks.  We also discussed weight reduction regular exercise good diet and avoiding caffeine and other stimulants.  He should follow-up in 2 to 3 weeks with his primary care physician for reevaluation.  I have reviewed the nursing notes.    I have reviewed the findings, diagnosis, plan and need for follow up with the patient.       New Prescriptions    AMLODIPINE (NORVASC) 5 MG TABLET    Take 1 tablet (5 mg) by mouth daily       Final diagnoses:   Essential hypertension       10/7/2021   [facility] EMERGENCY DEPT     Lee, Ruel SCOTT MD  10/07/21 2037

## 2021-10-08 NOTE — ED TRIAGE NOTES
"Pt c/o \"high blood pressure\" today.  Denies CP, SOB.  left sided hand tingling since Monday, thinks r/t grinding meat.   "

## 2021-11-27 LAB
ALT SERPL-CCNC: 51 U/L (ref 9–46)
AST SERPL-CCNC: 27 U/L (ref 10–40)
CHOLESTEROL (EXTERNAL): 155 MG/DL
CREATININE (EXTERNAL): 0.89 MG/DL (ref 0.6–1.35)
GFR ESTIMATED (EXTERNAL): 105 ML/MIN/1.73M2
GFR ESTIMATED (IF AFRICAN AMERICAN) (EXTERNAL): 121 ML/MIN/1.73M2
GLUCOSE (EXTERNAL): 99 MG/DL (ref 65–99)
HDLC SERPL-MCNC: 39 MG/DL
LDL CHOLESTEROL (EXTERNAL): 85 MG/DL
NON HDL CHOLESTEROL (EXTERNAL): 116 MG/DL
POTASSIUM (EXTERNAL): 4.1 MMOL/L (ref 3.5–5.3)
TRIGLYCERIDES (EXTERNAL): 221 MG/DL

## 2022-03-11 DIAGNOSIS — G47.33 OBSTRUCTIVE SLEEP APNEA: Primary | ICD-10-CM

## 2022-07-09 ENCOUNTER — TRANSFERRED RECORDS (OUTPATIENT)
Dept: HEALTH INFORMATION MANAGEMENT | Facility: CLINIC | Age: 42
End: 2022-07-09

## 2022-07-09 LAB
ALT SERPL-CCNC: 58 U/L (ref 9–46)
AST SERPL-CCNC: 34 U/L (ref 10–40)
CHOLESTEROL (EXTERNAL): 143 MG/DL
CREATININE (EXTERNAL): 0.83 MG/DL (ref 0.6–1.35)
GFR ESTIMATED (EXTERNAL): 107 ML/MIN/1.73M2
GFR ESTIMATED (IF AFRICAN AMERICAN) (EXTERNAL): 124 ML/MIN/1.73M2
GLUCOSE (EXTERNAL): 110 MG/DL (ref 65–99)
HBA1C MFR BLD: 5.7 %
HDLC SERPL-MCNC: 42 MG/DL
LDL CHOLESTEROL (EXTERNAL): 71 MG/DL
NON HDL CHOLESTEROL (EXTERNAL): 101 MG/DL
POTASSIUM (EXTERNAL): 4.4 MMOL/L (ref 3.5–5.3)
TRIGLYCERIDES (EXTERNAL): 200 MG/DL
TSH SERPL-ACNC: 1.55 MIU/L (ref 0.4–4.5)

## 2022-11-19 ENCOUNTER — TRANSFERRED RECORDS (OUTPATIENT)
Dept: HEALTH INFORMATION MANAGEMENT | Facility: CLINIC | Age: 42
End: 2022-11-19

## 2022-11-20 ENCOUNTER — MEDICAL CORRESPONDENCE (OUTPATIENT)
Dept: HEALTH INFORMATION MANAGEMENT | Facility: CLINIC | Age: 42
End: 2022-11-20

## 2022-11-21 ENCOUNTER — TRANSCRIBE ORDERS (OUTPATIENT)
Dept: OTHER | Age: 42
End: 2022-11-21

## 2022-11-23 ENCOUNTER — TRANSCRIBE ORDERS (OUTPATIENT)
Dept: OTHER | Age: 42
End: 2022-11-23

## 2022-11-23 DIAGNOSIS — K63.5 COLON POLYP: ICD-10-CM

## 2022-11-23 DIAGNOSIS — K64.9 HEMORRHOIDS, UNSPECIFIED HEMORRHOID TYPE: Primary | ICD-10-CM

## 2022-11-30 ENCOUNTER — TRANSCRIBE ORDERS (OUTPATIENT)
Dept: OTHER | Age: 42
End: 2022-11-30

## 2022-11-30 DIAGNOSIS — K63.5 COLON POLYP: ICD-10-CM

## 2022-11-30 DIAGNOSIS — K64.9 HEMORRHOIDS, UNSPECIFIED HEMORRHOID TYPE: Primary | ICD-10-CM

## 2022-12-01 NOTE — TELEPHONE ENCOUNTER
Diagnosis, Referred by & from: Hemorrhoids; referred by Elenita Schmitz NP   Appt date: 3/9/2023   NOTES STATUS DETAILS   OFFICE NOTE from referring provider Received [facility]:  11/19/22, 7/9/22 - PCC OV with Elenita Schmitz NP   OFFICE NOTE from other specialist N/A    DISCHARGE SUMMARY from hospital N/A    DISCHARGE REPORT from the ER Internal [facility]:  9/13/17 - ED OV with Dr. Garay   OPERATIVE REPORT N/A    MEDICATION LIST Received    LABS     BIOPSIES/PATHOLOGY RELATED TO DIAGNOSIS Internal [facility]:  10/9/17 - Colon Biopsy (Case: [id number])   DIAGNOSTIC PROCEDURES     COLONOSCOPY [facility]:  10/9/17 - Colonoscopy   IMAGING (DISC & REPORT) N/A       Records Requested  12/01/22    Facility  [facility]  Fax: [phone]   Outcome * 12/1/22 10:34 AM Faxed req to [facility] for records to be faxed to the clinic. - Jeanette    * 12/5/22 1:35 PM Records received from [facility] and sent to HIM to be scanned into the chart. Lj Reid

## 2023-03-09 ENCOUNTER — PRE VISIT (OUTPATIENT)
Dept: SURGERY | Facility: CLINIC | Age: 43
End: 2023-03-09

## 2023-03-09 ENCOUNTER — OFFICE VISIT (OUTPATIENT)
Dept: SURGERY | Facility: CLINIC | Age: 43
End: 2023-03-09
Payer: COMMERCIAL

## 2023-03-09 ENCOUNTER — LAB (OUTPATIENT)
Dept: LAB | Facility: CLINIC | Age: 43
End: 2023-03-09
Payer: COMMERCIAL

## 2023-03-09 VITALS
SYSTOLIC BLOOD PRESSURE: 118 MMHG | DIASTOLIC BLOOD PRESSURE: 76 MMHG | WEIGHT: 285.9 LBS | OXYGEN SATURATION: 98 % | HEART RATE: 119 BPM | HEIGHT: 72 IN | BODY MASS INDEX: 38.72 KG/M2

## 2023-03-09 DIAGNOSIS — K62.5 RECTAL BLEEDING: ICD-10-CM

## 2023-03-09 DIAGNOSIS — K62.5 RECTAL BLEEDING: Primary | ICD-10-CM

## 2023-03-09 DIAGNOSIS — K64.9 HEMORRHOIDS, UNSPECIFIED HEMORRHOID TYPE: ICD-10-CM

## 2023-03-09 LAB — HGB BLD-MCNC: 16.6 G/DL (ref 13.3–17.7)

## 2023-03-09 PROCEDURE — 36415 COLL VENOUS BLD VENIPUNCTURE: CPT | Performed by: PATHOLOGY

## 2023-03-09 PROCEDURE — 85018 HEMOGLOBIN: CPT | Performed by: PATHOLOGY

## 2023-03-09 PROCEDURE — 99203 OFFICE O/P NEW LOW 30 MIN: CPT | Performed by: NURSE PRACTITIONER

## 2023-03-09 RX ORDER — LISINOPRIL 10 MG/1
10 TABLET ORAL DAILY
COMMUNITY

## 2023-03-09 ASSESSMENT — PAIN SCALES - GENERAL: PAINLEVEL: NO PAIN (1)

## 2023-03-09 NOTE — LETTER
"3/9/2023       RE: Howie Yang  [address] [place] [address]     Dear Colleague,    Thank you for referring your patient, Howie Yang, to the [facility] COLON AND RECTAL SURGERY CLINIC [place] at [facility]. Please see a copy of my visit note below.    Colon and Rectal Surgery Consult Clinic Note    Date: 3/9/2023     Referring provider:  Referred Self, MD  No address on file     RE: Howie Yang  : 1980  KATT: 3/9/2023    Howie Yang is a very pleasant 43 year old male who presents today for rectal bleeding.    HPI:  Is eating a lot more fiber but symptoms are getting worse. Having nausea once or twice a week and feels like he needs to vomiting. This then goes away abruptly. He now has nausea almost daily. This sometimes only lasts a few minutes. No associated abdominal pain. This has been bad since about November of last year. Bowel movements can be formed to watery. Has occasionally had pencil thin stools. Has hemorrhoids that he does not manually reduce because this seems to cause worse bleeding. He has had bleeding that can last up to an hour on occasion. He mostly has some \"irriting bleeding\" that is mostly with bowel movements. He has some leakage of watery discharge from his anus. Weight is stable. Appetite has been fine. Not on any bowel medications.  Colonoscopy 2017 for rectal bleeding with hyperplastic polyps only.  No family history of IBD. Mother had stomach cancer in her 70s. Maternal grandfather with a cancer somewhere in his abdomen found postmortem in his 80s. Paternal great grandfather with an unknown kind of cancer.    Physical Examination: Exam was chaperoned by Nicho Powell, EMT-P   /76 (BP Location: Left arm, Patient Position: Sitting, Cuff Size: Adult Large)   Pulse 119   Ht 6'   Wt 285 lb 14.4 oz   SpO2 98%   BMI 38.78 kg/m    General: alert, oriented, in no acute distress, sitting comfortably  HEENT: " mucous membranes moist  Perianal external examination:  Perianal skin: Intact with no excoriation or lichenification.  Lesions: No evidence of an external lesion, nodularity, or induration in the perianal region.  Eversion of buttocks: There was not evidence of an anal fissure. Details: N/A.  Skin tags or external hemorrhoids: None.  Digital rectal examination: Was performed.   Sphincter tone: Good.  Palpable lesions: Yes - Location: soft internal hemorrhoid palpable.  Prostate: Normal.  Other: None.    Anoscopy: Was performed.   Hemorrhoids: Yes. Grade 3 internal hemorrhoids circumferentially  Lesions: No    Assessment/Plan: 43 year old male who presents today for rectal bleeding. Has had rectal bleeding and nausea for several months. Does have large hemorrhoids and I recommended surgical hemorrhoidectomy given the size and ongoing bleeding. We discussed risks of hemorrhoidectomy surgery and associated pain and recovery. I would like him to first get a colonoscopy given his nausea. If this is normal, will refer to gastroenterology. He would like to think more about the hemorrhoidectomy before scheduling but is agreeable to the colonoscopy. Will also recheck hgb today. Patient's questions were answered to his stated satisfaction and he is in agreement with this plan.     Medical history:  Past Medical History:   Diagnosis Date     NO ACTIVE PROBLEMS        Surgical history:  Past Surgical History:   Procedure Laterality Date     VASECTOMY  2/4/2016       Problem list:    Patient Active Problem List    Diagnosis Date Noted     AYANA (obstructive sleep apnea) 12/17/2019     Priority: Medium     Obesity (BMI 35.0-39.9) with comorbidity (H) 12/17/2019     Priority: Medium     Adjustment disorder with mixed anxiety and depressed mood 03/20/2018     Priority: Medium     CARDIOVASCULAR SCREENING; LDL GOAL LESS THAN 160 10/31/2010     Priority: Medium       Medications:  Current Outpatient Medications   Medication Sig  Dispense Refill     lisinopril (ZESTRIL) 10 MG tablet Take 10 mg by mouth daily       amLODIPine (NORVASC) 5 MG tablet Take 1 tablet (5 mg) by mouth daily 30 tablet 0     order for DME Equipment ordered: RESMED Auto PAP Mask type: Full face  Settings: 5-15 CM H2O       sildenafil (VIAGRA) 50 MG tablet Take 0.5-1 tablets (25-50 mg) by mouth daily as needed 5 tablet 3       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]      No Known Drug Allergies        Family history:  Family History   Problem Relation Age of Onset     Hypertension Father      Hypertension Brother        Social history:  Social History     Tobacco Use     Smoking status: Former     Smokeless tobacco: Former     Types: Snuff   Substance Use Topics     Alcohol use: No     Alcohol/week: 0.0 standard drinks    Marital status: .    Nursing Notes:   Nicho Powell, EMT  3/9/2023 12:40 PM  Signed  Chief Complaint   Patient presents with     New Patient     Hemorrhoids       Vitals:    03/09/23 1233   BP: 118/76   BP Location: Left arm   Patient Position: Sitting   Cuff Size: Adult Large   Pulse: 119   SpO2: 98%   Weight: 285 lb 14.4 oz   Height: 6'       Body mass index is 38.78 kg/m .     Nicho Powell, EMT- P         30 minutes spent on the date of encounter performing chart review, history and exam, documentation and further activities as noted above with an additional 2 minutes for anoscopy.     AKILA Amos, NP-C  Colon and Rectal Surgery   [facility]    This note was created using speech recognition software and may contain unintended word substitutions.

## 2023-03-09 NOTE — PROGRESS NOTES
"Colon and Rectal Surgery Consult Clinic Note    Date: 3/9/2023     Referring provider:  Referred Self, MD  No address on file     RE: Howie Yang  : 1980  KATT: 3/9/2023    Howie Yang is a very pleasant 43 year old male who presents today for rectal bleeding.    HPI:  Is eating a lot more fiber but symptoms are getting worse. Having nausea once or twice a week and feels like he needs to vomiting. This then goes away abruptly. He now has nausea almost daily. This sometimes only lasts a few minutes. No associated abdominal pain. This has been bad since about November of last year. Bowel movements can be formed to watery. Has occasionally had pencil thin stools. Has hemorrhoids that he does not manually reduce because this seems to cause worse bleeding. He has had bleeding that can last up to an hour on occasion. He mostly has some \"irriting bleeding\" that is mostly with bowel movements. He has some leakage of watery discharge from his anus. Weight is stable. Appetite has been fine. Not on any bowel medications.  Colonoscopy  for rectal bleeding with hyperplastic polyps only.  No family history of IBD. Mother had stomach cancer in her 70s. Maternal grandfather with a cancer somewhere in his abdomen found postmortem in his 80s. Paternal great grandfather with an unknown kind of cancer.    Physical Examination: Exam was chaperoned by Nicho Powell, EMT-P   /76 (BP Location: Left arm, Patient Position: Sitting, Cuff Size: Adult Large)   Pulse 119   Ht 6'   Wt 285 lb 14.4 oz   SpO2 98%   BMI 38.78 kg/m    General: alert, oriented, in no acute distress, sitting comfortably  HEENT: mucous membranes moist  Perianal external examination:  Perianal skin: Intact with no excoriation or lichenification.  Lesions: No evidence of an external lesion, nodularity, or induration in the perianal region.  Eversion of buttocks: There was not evidence of an anal fissure. Details: N/A.  Skin tags or external " hemorrhoids: None.  Digital rectal examination: Was performed.   Sphincter tone: Good.  Palpable lesions: Yes - Location: soft internal hemorrhoid palpable.  Prostate: Normal.  Other: None.    Anoscopy: Was performed.   Hemorrhoids: Yes. Grade 3 internal hemorrhoids circumferentially  Lesions: No    Assessment/Plan: 43 year old male who presents today for rectal bleeding. Has had rectal bleeding and nausea for several months. Does have large hemorrhoids and I recommended surgical hemorrhoidectomy given the size and ongoing bleeding. We discussed risks of hemorrhoidectomy surgery and associated pain and recovery. I would like him to first get a colonoscopy given his nausea. If this is normal, will refer to gastroenterology. He would like to think more about the hemorrhoidectomy before scheduling but is agreeable to the colonoscopy. Will also recheck hgb today. Patient's questions were answered to his stated satisfaction and he is in agreement with this plan.     Medical history:  Past Medical History:   Diagnosis Date     NO ACTIVE PROBLEMS        Surgical history:  Past Surgical History:   Procedure Laterality Date     VASECTOMY  2/4/2016       Problem list:    Patient Active Problem List    Diagnosis Date Noted     AYANA (obstructive sleep apnea) 12/17/2019     Priority: Medium     Obesity (BMI 35.0-39.9) with comorbidity (H) 12/17/2019     Priority: Medium     Adjustment disorder with mixed anxiety and depressed mood 03/20/2018     Priority: Medium     CARDIOVASCULAR SCREENING; LDL GOAL LESS THAN 160 10/31/2010     Priority: Medium       Medications:  Current Outpatient Medications   Medication Sig Dispense Refill     lisinopril (ZESTRIL) 10 MG tablet Take 10 mg by mouth daily       amLODIPine (NORVASC) 5 MG tablet Take 1 tablet (5 mg) by mouth daily 30 tablet 0     order for DME Equipment ordered: RESMED Auto PAP Mask type: Full face  Settings: 5-15 CM H2O       sildenafil (VIAGRA) 50 MG tablet Take 0.5-1 tablets  (25-50 mg) by mouth daily as needed 5 tablet 3       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]      No Known Drug Allergies        Family history:  Family History   Problem Relation Age of Onset     Hypertension Father      Hypertension Brother        Social history:  Social History     Tobacco Use     Smoking status: Former     Smokeless tobacco: Former     Types: Snuff   Substance Use Topics     Alcohol use: No     Alcohol/week: 0.0 standard drinks    Marital status: .    Nursing Notes:   Nicho Powell, EMT  3/9/2023 12:40 PM  Signed  Chief Complaint   Patient presents with     New Patient     Hemorrhoids       Vitals:    03/09/23 1233   BP: 118/76   BP Location: Left arm   Patient Position: Sitting   Cuff Size: Adult Large   Pulse: 119   SpO2: 98%   Weight: 285 lb 14.4 oz   Height: 6'       Body mass index is 38.78 kg/m .     Nicho Powell, EMT- P         30 minutes spent on the date of encounter performing chart review, history and exam, documentation and further activities as noted above with an additional 2 minutes for anoscopy.     AKILA Amos, NP-C  Colon and Rectal Surgery   [facility]    This note was created using speech recognition software and may contain unintended word substitutions.

## 2023-03-09 NOTE — NURSING NOTE
Chief Complaint   Patient presents with     New Patient     Hemorrhoids       Vitals:    03/09/23 1233   BP: 118/76   BP Location: Left arm   Patient Position: Sitting   Cuff Size: Adult Large   Pulse: 119   SpO2: 98%   Weight: 285 lb 14.4 oz   Height: 6'       Body mass index is 38.78 kg/m .     Nicho Powell, EMT- P

## 2023-03-09 NOTE — PATIENT INSTRUCTIONS
Start a daily fiber supplement such as Citrucel or Metamucil. Start with once a day and slowly increase up to three times a day, if needed, over the next 4-6 weeks

## 2023-03-20 ENCOUNTER — TELEPHONE (OUTPATIENT)
Dept: GASTROENTEROLOGY | Facility: CLINIC | Age: 43
End: 2023-03-20
Payer: COMMERCIAL

## 2023-03-20 NOTE — TELEPHONE ENCOUNTER
Screening Questions  BLUE  KIND OF PREP RED  LOCATION [review exclusion criteria] GREEN  SEDATION TYPE        N Are you active on mychart?       KENIA SALDANA Ordering/Referring Provider?        BLUE PLUS What type of coverage do you have?      N Have you had a positive covid test in the last 14 days?     38.6 1. BMI  [BMI 40+ - review exclusion criteria]    Y  2. Are you able to give consent for your medical care? [IF NO,RN REVIEW]          N  3. Are you taking any prescription pain medications on a routine schedule   (ex narcotics: oxycodone, roxicodone, oxycontin,  and percocet)? [RN Review]          3a. EXTENDED PREP What kind of prescription?     N 4. Do you have any chemical dependencies such as alcohol, street drugs, or methadone? THE PATIENT HAS BEEN SOBER FOR QUITE A WHILE      **If yes 3- 5 , please schedule with MAC sedation.**          IF YES TO ANY 6 - 10 - HOSPITAL SETTING ONLY.     N 6.   Do you need assistance transferring?     N 7.   Have you had a heart or lung transplant?    N 8.   Are you currently on dialysis?   N 9.   Do you use daily home oxygen?   N 10. Do you take nitroglycerin?   10a.  If yes, how often?     11. [FEMALES]   Are you currently pregnant?    11a.  If yes, how many weeks? [ Greater than 12 weeks, OR NEEDED]    N 12. Do you have Pulmonary Hypertension? *NEED PAC APPT AT [facility] w/ MAC*     N 13. [review exclusion criteria]  Do you have any implantable devices in your body (pacemaker, defib, LVAD)?    N 14. In the past 6 months, have you had any heart related issues including cardiomyopathy or heart attack?     14a.  If yes, did it require cardiac stenting if so when?     N 15. Have you had a stroke or Transient ischemic attack (TIA - aka  mini stroke ) within 6 months?      Y- USES CPAP 16. Do you have mod to severe Obstructive Sleep Apnea?  [Hospital only]    N 17. Do you have SEVERE AND UNCONTROLLED asthma? *NEED PAC APPT AT [facility] w/MAC*     18. Are you currently taking any  "blood thinners?     18a. No. Continue to 19.   18b. Yes/no Blood Thinner: No [CONTINUE TO #19]    N 19. Do you take the medication Phentermine?    19a. If yes, \"Hold for 7 days before procedure.  Please consult your prescribing provider if you have questions about holding this medication.\"     N  20. Do you have chronic kidney disease?      N  21. Do you have a diagnosis of diabetes?     N  22. On a regular basis do you go 3-5 days between bowel movements?      23. Preferred LOCAL Pharmacy for Pre Prescription    [ LIST ONLY ONE PHARMACY]     [facility] IN [place]      - CLOSING REMINDERS -    Informed patient they will need an adult    Cannot take any type of public or medical transportation alone    Conscious Sedation- Needs  for 6 hours after the procedure       MAC/General-Needs  for 24 hours after procedure    Pre-Procedure Covid test to be completed [[facility] PCR Testing Required]    Confirmed Nurse will call to complete assessment       - SCHEDULING DETAILS -  YES Hospital Setting Required? If yes, what is the exclusion?: AYANA   BINU  Surgeon    4/21  Date of Procedure  Lower Endoscopy [Colonoscopy]  Type of Procedure Scheduled  [facility]-If you answer yes to questions #8, #20, #21Which Colonoscopy Prep was Sent?     CS Sedation Type     N PAC / Pre-op Required                 "

## 2023-04-10 ENCOUNTER — TELEPHONE (OUTPATIENT)
Dept: GASTROENTEROLOGY | Facility: CLINIC | Age: 43
End: 2023-04-10

## 2023-04-10 DIAGNOSIS — K62.5 RECTAL BLEEDING: Primary | ICD-10-CM

## 2023-04-10 RX ORDER — BISACODYL 5 MG/1
TABLET, DELAYED RELEASE ORAL
Qty: 4 TABLET | Refills: 0 | Status: SHIPPED | OUTPATIENT
Start: 2023-04-10 | End: 2023-12-12

## 2023-04-10 NOTE — TELEPHONE ENCOUNTER
Attempted to contact patient regarding upcoming Colonoscopy  procedure on 4/21/23 for pre assessment questions. No answer.     Left message to return call to [phone] #4    Discuss Covid policy and designated  policy.    Pre op exam? N/A    Arrival time: 1345. Procedure time: 1445    Facility location: [facility]; [address], [address], [place] [address]    Sedation type: Conscious sedation     Anticoagulants: No    Electronic implanted devices? No    Diabetic? No    Indication for procedure: rectal bleeding    Bowel prep recommendation: Standard Golytely      Prep instructions sent via letter, sent by scheduling staff. Bowel prep script sent to    [facility]    Clarisa Diamond RN  Endoscopy Procedure Pre Assessment RN

## 2023-04-11 NOTE — TELEPHONE ENCOUNTER
Pre assessment questions completed for upcoming Colonoscopy  procedure scheduled on 04.21.2023    COVID policy reviewed.     Reviewed procedural arrival time 1345 and facility location [facility]; [address], [address], [place] [address]    Designated  policy reviewed. Instructed to have someone stay 6 hours post procedure.     Anticoagulation/blood thinners? No    Electronic implanted devices? No    Diabetic? No    Reviewed procedure prep instructions.     Patient verbalized understanding and had no questions or concerns at this time.    Loulou Fischer RN  Endoscopy Procedure Pre Assessment RN

## 2023-04-21 ENCOUNTER — ANESTHESIA EVENT (OUTPATIENT)
Dept: GASTROENTEROLOGY | Facility: CLINIC | Age: 43
End: 2023-04-21
Payer: COMMERCIAL

## 2023-04-21 ENCOUNTER — ANESTHESIA (OUTPATIENT)
Dept: GASTROENTEROLOGY | Facility: CLINIC | Age: 43
End: 2023-04-21
Payer: COMMERCIAL

## 2023-04-21 ENCOUNTER — HOSPITAL ENCOUNTER (OUTPATIENT)
Facility: CLINIC | Age: 43
Discharge: HOME | End: 2023-04-21
Attending: INTERNAL MEDICINE | Admitting: INTERNAL MEDICINE
Payer: COMMERCIAL

## 2023-04-21 VITALS
HEART RATE: 81 BPM | DIASTOLIC BLOOD PRESSURE: 72 MMHG | SYSTOLIC BLOOD PRESSURE: 125 MMHG | RESPIRATION RATE: 16 BRPM | OXYGEN SATURATION: 98 %

## 2023-04-21 LAB — COLONOSCOPY: NORMAL

## 2023-04-21 PROCEDURE — 45385 COLONOSCOPY W/LESION REMOVAL: CPT | Mod: PT | Performed by: INTERNAL MEDICINE

## 2023-04-21 PROCEDURE — 370N000017 HC ANESTHESIA TECHNICAL FEE, PER MIN: Performed by: INTERNAL MEDICINE

## 2023-04-21 PROCEDURE — 250N000009 HC RX 250: Performed by: NURSE ANESTHETIST, CERTIFIED REGISTERED

## 2023-04-21 PROCEDURE — 250N000011 HC RX IP 250 OP 636: Performed by: NURSE ANESTHETIST, CERTIFIED REGISTERED

## 2023-04-21 PROCEDURE — 88305 TISSUE EXAM BY PATHOLOGIST: CPT | Mod: 26 | Performed by: PATHOLOGY

## 2023-04-21 PROCEDURE — 45380 COLONOSCOPY AND BIOPSY: CPT | Performed by: INTERNAL MEDICINE

## 2023-04-21 PROCEDURE — 88305 TISSUE EXAM BY PATHOLOGIST: CPT | Mod: TC | Performed by: INTERNAL MEDICINE

## 2023-04-21 PROCEDURE — 258N000003 HC RX IP 258 OP 636: Performed by: NURSE ANESTHETIST, CERTIFIED REGISTERED

## 2023-04-21 RX ORDER — HYDROMORPHONE HCL IN WATER/PF 6 MG/30 ML
0.2 PATIENT CONTROLLED ANALGESIA SYRINGE INTRAVENOUS EVERY 5 MIN PRN
Status: DISCONTINUED | OUTPATIENT
Start: 2023-04-21 | End: 2023-04-21 | Stop reason: HOSPADM

## 2023-04-21 RX ORDER — PROPOFOL 10 MG/ML
INJECTION, EMULSION INTRAVENOUS PRN
Status: DISCONTINUED | OUTPATIENT
Start: 2023-04-21 | End: 2023-04-21

## 2023-04-21 RX ORDER — ONDANSETRON 2 MG/ML
INJECTION INTRAMUSCULAR; INTRAVENOUS PRN
Status: DISCONTINUED | OUTPATIENT
Start: 2023-04-21 | End: 2023-04-21

## 2023-04-21 RX ORDER — ONDANSETRON 2 MG/ML
4 INJECTION INTRAMUSCULAR; INTRAVENOUS EVERY 6 HOURS PRN
Status: DISCONTINUED | OUTPATIENT
Start: 2023-04-21 | End: 2023-04-21 | Stop reason: HOSPADM

## 2023-04-21 RX ORDER — LIDOCAINE HYDROCHLORIDE 20 MG/ML
INJECTION, SOLUTION INFILTRATION; PERINEURAL PRN
Status: DISCONTINUED | OUTPATIENT
Start: 2023-04-21 | End: 2023-04-21

## 2023-04-21 RX ORDER — PROPOFOL 10 MG/ML
INJECTION, EMULSION INTRAVENOUS CONTINUOUS PRN
Status: DISCONTINUED | OUTPATIENT
Start: 2023-04-21 | End: 2023-04-21

## 2023-04-21 RX ORDER — ONDANSETRON 2 MG/ML
4 INJECTION INTRAMUSCULAR; INTRAVENOUS
Status: DISCONTINUED | OUTPATIENT
Start: 2023-04-21 | End: 2023-04-21 | Stop reason: HOSPADM

## 2023-04-21 RX ORDER — ONDANSETRON 2 MG/ML
4 INJECTION INTRAMUSCULAR; INTRAVENOUS EVERY 30 MIN PRN
Status: DISCONTINUED | OUTPATIENT
Start: 2023-04-21 | End: 2023-04-21 | Stop reason: HOSPADM

## 2023-04-21 RX ORDER — FENTANYL CITRATE 50 UG/ML
50 INJECTION, SOLUTION INTRAMUSCULAR; INTRAVENOUS EVERY 5 MIN PRN
Status: DISCONTINUED | OUTPATIENT
Start: 2023-04-21 | End: 2023-04-21 | Stop reason: HOSPADM

## 2023-04-21 RX ORDER — LIDOCAINE 40 MG/G
CREAM TOPICAL
Status: DISCONTINUED | OUTPATIENT
Start: 2023-04-21 | End: 2023-04-21 | Stop reason: HOSPADM

## 2023-04-21 RX ORDER — NALOXONE HYDROCHLORIDE 0.4 MG/ML
0.2 INJECTION, SOLUTION INTRAMUSCULAR; INTRAVENOUS; SUBCUTANEOUS
Status: DISCONTINUED | OUTPATIENT
Start: 2023-04-21 | End: 2023-04-21 | Stop reason: HOSPADM

## 2023-04-21 RX ORDER — FLUMAZENIL 0.1 MG/ML
0.2 INJECTION, SOLUTION INTRAVENOUS
Status: DISCONTINUED | OUTPATIENT
Start: 2023-04-21 | End: 2023-04-21 | Stop reason: HOSPADM

## 2023-04-21 RX ORDER — HYDROMORPHONE HCL IN WATER/PF 6 MG/30 ML
0.4 PATIENT CONTROLLED ANALGESIA SYRINGE INTRAVENOUS EVERY 5 MIN PRN
Status: DISCONTINUED | OUTPATIENT
Start: 2023-04-21 | End: 2023-04-21 | Stop reason: HOSPADM

## 2023-04-21 RX ORDER — OXYCODONE HYDROCHLORIDE 5 MG/1
5 TABLET ORAL
Status: DISCONTINUED | OUTPATIENT
Start: 2023-04-21 | End: 2023-04-21 | Stop reason: HOSPADM

## 2023-04-21 RX ORDER — NALOXONE HYDROCHLORIDE 0.4 MG/ML
0.4 INJECTION, SOLUTION INTRAMUSCULAR; INTRAVENOUS; SUBCUTANEOUS
Status: DISCONTINUED | OUTPATIENT
Start: 2023-04-21 | End: 2023-04-21 | Stop reason: HOSPADM

## 2023-04-21 RX ORDER — FENTANYL CITRATE 50 UG/ML
25 INJECTION, SOLUTION INTRAMUSCULAR; INTRAVENOUS EVERY 5 MIN PRN
Status: DISCONTINUED | OUTPATIENT
Start: 2023-04-21 | End: 2023-04-21 | Stop reason: HOSPADM

## 2023-04-21 RX ORDER — ONDANSETRON 4 MG/1
4 TABLET, ORALLY DISINTEGRATING ORAL EVERY 6 HOURS PRN
Status: DISCONTINUED | OUTPATIENT
Start: 2023-04-21 | End: 2023-04-21 | Stop reason: HOSPADM

## 2023-04-21 RX ORDER — OXYCODONE HYDROCHLORIDE 10 MG/1
10 TABLET ORAL
Status: DISCONTINUED | OUTPATIENT
Start: 2023-04-21 | End: 2023-04-21 | Stop reason: HOSPADM

## 2023-04-21 RX ORDER — PROCHLORPERAZINE MALEATE 5 MG
10 TABLET ORAL EVERY 6 HOURS PRN
Status: DISCONTINUED | OUTPATIENT
Start: 2023-04-21 | End: 2023-04-21 | Stop reason: HOSPADM

## 2023-04-21 RX ORDER — SODIUM CHLORIDE, SODIUM LACTATE, POTASSIUM CHLORIDE, CALCIUM CHLORIDE 600; 310; 30; 20 MG/100ML; MG/100ML; MG/100ML; MG/100ML
INJECTION, SOLUTION INTRAVENOUS CONTINUOUS PRN
Status: DISCONTINUED | OUTPATIENT
Start: 2023-04-21 | End: 2023-04-21

## 2023-04-21 RX ORDER — SODIUM CHLORIDE, SODIUM LACTATE, POTASSIUM CHLORIDE, CALCIUM CHLORIDE 600; 310; 30; 20 MG/100ML; MG/100ML; MG/100ML; MG/100ML
INJECTION, SOLUTION INTRAVENOUS CONTINUOUS
Status: DISCONTINUED | OUTPATIENT
Start: 2023-04-21 | End: 2023-04-21 | Stop reason: HOSPADM

## 2023-04-21 RX ORDER — ONDANSETRON 4 MG/1
4 TABLET, ORALLY DISINTEGRATING ORAL EVERY 30 MIN PRN
Status: DISCONTINUED | OUTPATIENT
Start: 2023-04-21 | End: 2023-04-21 | Stop reason: HOSPADM

## 2023-04-21 RX ADMIN — PROPOFOL 70 MG: 10 INJECTION, EMULSION INTRAVENOUS at 14:09

## 2023-04-21 RX ADMIN — PROPOFOL 150 MCG/KG/MIN: 10 INJECTION, EMULSION INTRAVENOUS at 14:09

## 2023-04-21 RX ADMIN — PROPOFOL 20 MG: 10 INJECTION, EMULSION INTRAVENOUS at 14:12

## 2023-04-21 RX ADMIN — LIDOCAINE HYDROCHLORIDE 100 MG: 20 INJECTION, SOLUTION INFILTRATION; PERINEURAL at 14:09

## 2023-04-21 RX ADMIN — SODIUM CHLORIDE, POTASSIUM CHLORIDE, SODIUM LACTATE AND CALCIUM CHLORIDE: 600; 310; 30; 20 INJECTION, SOLUTION INTRAVENOUS at 14:00

## 2023-04-21 RX ADMIN — ONDANSETRON 4 MG: 2 INJECTION INTRAMUSCULAR; INTRAVENOUS at 14:09

## 2023-04-21 ASSESSMENT — LIFESTYLE VARIABLES: TOBACCO_USE: 0

## 2023-04-21 ASSESSMENT — ACTIVITIES OF DAILY LIVING (ADL): ADLS_ACUITY_SCORE: 35

## 2023-04-21 NOTE — H&P
Pre-Endoscopy History and Physical     Howie Yang MRN# [id number]   YOB: 1980 Age: 43 year old     Date of Procedure: 4/21/2023  Primary care provider: Elenita Schmitz  Type of Endoscopy: Colonoscopy with possible biopsy, possible polypectomy  Reason for Procedure: rectal bleeding  Type of Anesthesia Anticipated: Per anesthesia     HPI:    Howie is a 43 year old male who will be undergoing the above procedure.      A history and physical has been performed. The patient's medications and allergies have been reviewed. The risks and benefits of the procedure and the sedation options and risks were discussed with the patient.  All questions were answered and informed consent was obtained.      He denies a personal or family history of anesthesia complications or bleeding disorders.     Patient Active Problem List   Diagnosis     CARDIOVASCULAR SCREENING; LDL GOAL LESS THAN 160     Adjustment disorder with mixed anxiety and depressed mood     AYANA (obstructive sleep apnea)     Obesity (BMI 35.0-39.9) with comorbidity (H)        Past Medical History:   Diagnosis Date     NO ACTIVE PROBLEMS         Past Surgical History:   Procedure Laterality Date     VASECTOMY  2/4/2016       Social History     Tobacco Use     Smoking status: Former     Smokeless tobacco: Former     Types: Snuff   Vaping Use     Vaping status: Not on file   Substance Use Topics     Alcohol use: No     Alcohol/week: 0.0 standard drinks of alcohol       Family History   Problem Relation Age of Onset     Hypertension Father      Hypertension Brother        Prior to Admission medications    Medication Sig Start Date End Date Taking? Authorizing Provider   amLODIPine (NORVASC) 5 MG tablet Take 1 tablet (5 mg) by mouth daily 10/7/21 11/6/21  Lee, Ruel SCOTT MD   bisacodyl (DULCOLAX) 5 MG EC tablet Take 2 tablets at 3 pm the day before your procedure. If your procedure is before 11 am, take 2 additional tablets at 11 pm. If your procedure is  after 11 am, take 2 additional tablets at 6 am. For additional instructions refer to your colonoscopy prep instructions. 4/10/23   Baldomero Ang MD   lisinopril (ZESTRIL) 10 MG tablet Take 10 mg by mouth daily    Reported, Patient   order for DME Equipment ordered: RESMED Auto PAP Mask type: Full face  Settings: 5-15 CM H2O    Reported, Patient   polyethylene glycol (GOLYTELY) 236 g suspension The night before the exam at 6 pm drink an 8-ounce glass every 15 minutes until the jug is half empty. If you arrive before 11 AM: Drink the other half of the Golytely jug at 11 PM night before procedure. If you arrive after 11 AM: Drink the other half of the Golytely jug at 6 AM day of procedure. For additional instructions refer to your colonoscopy prep instructions. 4/10/23   Baldomero Ang MD   sildenafil (VIAGRA) 50 MG tablet Take 0.5-1 tablets (25-50 mg) by mouth daily as needed 10/27/20   Tian Badillo MD       Allergies   Allergen Reactions     Horseradish [Cochlearia Armoracia]      No Known Drug Allergies         REVIEW OF SYSTEMS:   5 point ROS negative except as noted above in HPI, including Gen., Resp., CV, GI &  system review.    PHYSICAL EXAM:   There were no vitals taken for this visit. Estimated body mass index is 38.78 kg/m  as calculated from the following:    Height as of 3/9/23: 1.829 m (6').    Weight as of 3/9/23: 129.7 kg (285 lb 14.4 oz).   GENERAL APPEARANCE: alert, and oriented  MENTAL STATUS: alert  AIRWAY EXAM: Mallampatti Class III (visualization of the soft palate and base of uvula)  RESP: lungs clear to auscultation - no rales, rhonchi or wheezes  CV: regular rates and rhythm  DIAGNOSTICS:    Not indicated    IMPRESSION   ASA Class 3 - Severe systemic disease, but not incapacitating    PLAN:   Plan for Colonoscopy with possible biopsy, possible polypectomy. We discussed the risks, benefits and alternatives and the patient wished to proceed.    The above has been forwarded to the  consulting provider.      Signed Electronically by: Baldomero Ang MD  April 21, 2023

## 2023-04-21 NOTE — ANESTHESIA PREPROCEDURE EVALUATION
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     No history of anesthetic complications       ROS/MED HX  ENT/Pulmonary:     (+) sleep apnea, uses CPAP,  (-) tobacco use and asthma   Neurologic:       Cardiovascular:     (+) hypertension-----    METS/Exercise Tolerance: >4 METS    Hematologic:       Musculoskeletal:       GI/Hepatic:    (-) GERD   Renal/Genitourinary:       Endo:     (+) Obesity,     Psychiatric/Substance Use:       Infectious Disease:       Malignancy:       Other:            Medications Prior to Admission   Medication Sig Dispense Refill Last Dose     bisacodyl (DULCOLAX) 5 MG EC tablet Take 2 tablets at 3 pm the day before your procedure. If your procedure is before 11 am, take 2 additional tablets at 11 pm. If your procedure is after 11 am, take 2 additional tablets at 6 am. For additional instructions refer to your colonoscopy prep instructions. 4 tablet 0 2023     lisinopril (ZESTRIL) 10 MG tablet Take 10 mg by mouth daily   2023     polyethylene glycol (GOLYTELY) 236 g suspension The night before the exam at 6 pm drink an 8-ounce glass every 15 minutes until the jug is half empty. If you arrive before 11 AM: Drink the other half of the [employer] jug at 11 PM  night before procedure. If you arrive after 11 AM: Drink the other half of the "[employer]" jug at 6 AM day of procedure. For additional instructions refer to your colonoscopy prep instructions. 4000 mL 0 4/20/2023     amLODIPine (NORVASC) 5 MG tablet Take 1 tablet (5 mg) by mouth daily 30 tablet 0      order for DME Equipment ordered: RESMED Auto PAP Mask type: Full face  Settings: 5-15 CM H2O        sildenafil (VIAGRA) 50 MG tablet Take 0.5-1 tablets (25-50 mg) by mouth daily as needed 5 tablet 3        Physical Exam    Airway        Mallampati: I   TM distance: > 3 FB   Neck ROM: full   Mouth opening: > 3 cm    Respiratory Devices and Support         Dental     Comment: Chipped upper central incisors    (+) Minor Abnormalities - some fillings, tiny chips      Cardiovascular          Rhythm and rate: regular and normal     Pulmonary           breath sounds clear to auscultation           OUTSIDE LABS:  CBC:   Lab Results   Component Value Date    WBC 7.3 10/07/2021    WBC 5.8 07/14/2020    HGB 16.6 03/09/2023    HGB 16.2 10/07/2021    HCT 45.7 10/07/2021    HCT 47.1 07/14/2020     10/07/2021     07/14/2020     BMP:   Lab Results   Component Value Date     10/07/2021    POTASSIUM 3.8 10/07/2021    CHLORIDE 108 10/07/2021    CO2 25 10/07/2021    BUN 16 10/07/2021    CR 1.01 10/07/2021     (H) 10/07/2021    GLC 99 12/11/2012     HEPATIC:   Lab Results   Component Value Date    ALBUMIN 3.9 10/07/2021    PROTTOTAL 7.8 10/07/2021    ALT 76 (H) 10/07/2021    AST 34 10/07/2021    ALKPHOS 102 10/07/2021    BILITOTAL 0.3 10/07/2021     OTHER:   Lab Results   Component Value Date    PH 6.5 02/19/2002    DEBORAH 8.2 (L) 10/07/2021    CRP <2.9 07/14/2020       Anesthesia Plan    ASA Status:  2   NPO Status:  NPO Appropriate    Anesthesia Type: MAC.     - Reason for MAC: immobility needed, straight local not clinically adequate, chronic cardiopulmonary disease              Consents    Anesthesia Plan(s) and  associated risks, benefits, and realistic alternatives discussed. Questions answered and patient/representative(s) expressed understanding.     - Discussed: Risks, Benefits and Alternatives for BOTH SEDATION and the PROCEDURE were discussed     - Discussed with:  Patient         Postoperative Care    Pain management: IV analgesics, Oral pain medications.   PONV prophylaxis: Ondansetron (or other 5HT-3)     Comments:    Other Comments: The patient was given an opportunity to ask questions after discussion of the anesthetic plan and risks. All questions were answered. The patient wishes to proceed with the anesthetic plan.    Discussed potential for risks of dental damage and injury to oral soft tissues: Yes    Discussed potential for risk of positioning injuries: Yes    The anesthetic risks discussed include but are not limited to brain damage, damage to internal organs, heart attack, stroke, awareness, nausea/vomiting and exacerbation of underlying medical problems. Monitored anesthesia care is indicated for this case based on medical necessity for a safe procedure.    The risks, benefits, and alternatives to MAC sedation were discussed in detail with the patient by the anesthesiologist.    Risks of MAC sedation include:  - potential for conversion to general anesthesia  - potential for oversedation with need for ventilatory support  - potential for recall  - postoperative nausea and/or vomiting  - reaction to medications           H&P reviewed: Unable to attach H&P to encounter due to EHR limitations. H&P Update: appropriate H&P reviewed, patient examined. No interval changes since H&P (within 30 days).         Sha Gibson MD

## 2023-04-21 NOTE — ANESTHESIA POSTPROCEDURE EVALUATION
Patient: Howie Yang    Procedure: Procedure(s):  Colonoscopy       Anesthesia Type:  MAC    Note:  Disposition: Outpatient   Postop Pain Control: Uneventful            Sign Out: Well controlled pain   PONV: No   Neuro/Psych: Uneventful            Sign Out: Acceptable/Baseline neuro status   Airway/Respiratory: Uneventful            Sign Out: Acceptable/Baseline resp. status   CV/Hemodynamics: Uneventful            Sign Out: Acceptable CV status; No obvious hypovolemia; No obvious fluid overload   Other NRE: NONE   DID A NON-ROUTINE EVENT OCCUR? No           Last vitals:  Vitals Value Taken Time   /80 04/21/23 1434   Temp     Pulse 97 04/21/23 1434   Resp 20 04/21/23 1434   SpO2 97 % 04/21/23 1434       Electronically Signed By: Sha Gibson MD  April 21, 2023  2:40 PM

## 2023-04-21 NOTE — LETTER
"April 24, 2023      Howie Yang  [address] [address] [place] [id number]        Dear ,    We are writing to inform you of your test results.      Resulted Orders   Surgical Pathology Exam   Result Value Ref Range    Case Report       Surgical Pathology Report                         Case: [id number]                                  Authorizing Provider:  Baldomero Ang MD        Collected:           04/21/2023 02:32 PM          Ordering Location:     [facility]          Received:            04/21/2023 02:44 PM                                 Endoscopy                                                                    Pathologist:           Jasmeet Connors DO                                                            Specimen:    Large Intestine, Colon, Transverse colon polyp                                             Final Diagnosis       A.  COLON, TRANSVERSE, POLYP:  - Tubular adenoma  - No high-grade dysplasia or malignancy identified        Clinical Information       Rectal bleeding      Gross Description       A(1). Large Intestine, Colon, Transverse colon polyp:  The specimen is received in formalin with proper patient identification, labeled \"transverse colon polyp\".  The specimen consists of 3 irregular tan pieces of soft tissue averaging 0.2 cm in greatest dimension which are entirely submitted in cassette A1.      Microscopic Description       Microscopic examination was performed.        Performing Labs       The technical component of this testing was completed at [facility]        Case Images         I am writing to let you know the results of the polyps that werer removed at the time of your colonoscopy.  They returned as 1 precancerous polyp.     Based on guidelines published in 2020, the Multi-Society Task Force recommends a repeat colonoscopy in 7 years. Based on your colonoscopy and personal risk factors, I recommend you " have a colonoscopy in 7 years.     Of course should you develop any symptoms (such as unintended weight loss, blood in your stool or change in bowel pattern), you should let myself or your primary care doctor know as you may need an earlier procedure.      If you have any questions, please don't hesitate to contact the [facility]ology [facility] team at [phone].       Sincerely,      Baldomero ANDERSON MD

## 2023-04-21 NOTE — OR NURSING
Pt had colonoscopy under moderate sedation, with polypectomy by cold snare. Pt stable at time of transfer to [address] [place] [address], procedural report to Beverly LONGO.

## 2023-04-21 NOTE — ANESTHESIA CARE TRANSFER NOTE
Patient: Howie Yang    Procedure: Procedure(s):  Colonoscopy       Diagnosis: Rectal bleeding [K62.5]  Diagnosis Additional Information: No value filed.    Anesthesia Type:   MAC     Note:    Oropharynx: oropharynx clear of all foreign objects  Level of Consciousness: awake  Oxygen Supplementation: room air    Independent Airway: airway patency satisfactory and stable  Dentition: dentition unchanged  Vital Signs Stable: post-procedure vital signs reviewed and stable  Report to RN Given: handoff report given  Patient transferred to: Phase II    Handoff Report: Identifed the Patient, Identified the Reponsible Provider, Reviewed the pertinent medical history, Discussed the surgical course, Reviewed Intra-OP anesthesia mangement and issues during anesthesia, Set expectations for post-procedure period and Allowed opportunity for questions and acknowledgement of understanding      Vitals:  Vitals Value Taken Time   /80 04/21/23 1434   Temp     Pulse 97 04/21/23 1434   Resp 20 04/21/23 1434   SpO2 97 % 04/21/23 1434       Electronically Signed By: AKILA Ngo CRNA  April 21, 2023  2:35 PM

## 2023-04-24 LAB
PATH REPORT.COMMENTS IMP SPEC: NORMAL
PATH REPORT.COMMENTS IMP SPEC: NORMAL
PATH REPORT.FINAL DX SPEC: NORMAL
PATH REPORT.GROSS SPEC: NORMAL
PATH REPORT.MICROSCOPIC SPEC OTHER STN: NORMAL
PATH REPORT.RELEVANT HX SPEC: NORMAL
PHOTO IMAGE: NORMAL

## 2023-09-11 ENCOUNTER — TELEPHONE (OUTPATIENT)
Dept: SLEEP MEDICINE | Facility: CLINIC | Age: 43
End: 2023-09-11
Payer: COMMERCIAL

## 2023-09-11 DIAGNOSIS — G47.33 OSA (OBSTRUCTIVE SLEEP APNEA): Primary | ICD-10-CM

## 2023-12-12 ENCOUNTER — OFFICE VISIT (OUTPATIENT)
Dept: SLEEP MEDICINE | Facility: CLINIC | Age: 43
End: 2023-12-12
Payer: COMMERCIAL

## 2023-12-12 VITALS
HEART RATE: 96 BPM | WEIGHT: 292 LBS | TEMPERATURE: 97.3 F | BODY MASS INDEX: 39.6 KG/M2 | OXYGEN SATURATION: 96 % | SYSTOLIC BLOOD PRESSURE: 132 MMHG | DIASTOLIC BLOOD PRESSURE: 86 MMHG | RESPIRATION RATE: 18 BRPM

## 2023-12-12 DIAGNOSIS — G47.33 OSA (OBSTRUCTIVE SLEEP APNEA): Primary | ICD-10-CM

## 2023-12-12 PROCEDURE — 99203 OFFICE O/P NEW LOW 30 MIN: CPT | Performed by: PHYSICIAN ASSISTANT

## 2023-12-12 NOTE — PROGRESS NOTES
Does Howie have a CPAP/Bipap?  Yes     Type of mask: full face    [facility]: [place] [phone]    [email or web address]     Obstructive Sleep Apnea - PAP Follow-Up Visit    Chief Complaint   Patient presents with    CPAP Follow Up       Howie Yang comes in today for follow-up of their moderate sleep apnea, managed with CPAP.     No specialty comments available.    Overall, he rates the experience with PAP as 10 (0 poor, 10 great). The mask is comfortable.  The mask is not leaking .  He is not snoring with the mask on. He is not having gasp arousals.  He is not having significant oral/nasal dryness. The pressure is comfortable.     His PAP interface is Full Face Mask.    Bedtime is typically 11. Usually it takes about 5-10 minutes to fall asleep with the mask on. Wake time is typically 6:30.  Patient is using PAP therapy 6.5 hours per night. The patient is usually getting 6.5 hours of sleep per night.    He does feel rested in the morning.          No data recorded    ResMed   CPAP  cmH2O 30 day usage data:  100% of days with > 4 hours of use. 0/30 days with no use.   Average use 383 minutes per day.   95%ile Leak 45.6 L/min.   AHI 1.73 events per hour.     Auto-PAP 10.0 - 16.0 cmH2O 30 day usage data:    100% of days with > 4 hours of use. 0/30 days with no use.   Average use 383 minutes per day.   95%ile Leak 45.6 L/min.   CPAP 95% pressure 14.4 cm.   AHI 1.73 events per hour.         Past medical/surgical history, family history, social history, medications and allergies were reviewed.      Problem List:  Patient Active Problem List    Diagnosis Date Noted    AYANA (obstructive sleep apnea) 12/17/2019     Priority: Medium    Obesity (BMI 35.0-39.9) with comorbidity (H) 12/17/2019     Priority: Medium    Adjustment disorder with mixed anxiety and depressed mood 03/20/2018     Priority: Medium    CARDIOVASCULAR SCREENING; LDL GOAL LESS THAN 160 10/31/2010      Priority: Medium        There were no vitals taken for this visit.    Impression/Plan:     Moderate Sleep apnea. He is Tolerating PAP well, cannot sleep without it. His machine is almost 6 years old.  Apnea is well controlled and he benefits from treatment. Daytime symptoms are stable.   Replacement device and supplies ordered.     Howie ALVARO Yang will follow up in about 3 month(s).     Fifteen minutes spent with patient, all of which were spent face-to-face counseling, consulting, coordinating plan of care.      CC:  Elenita Schmitz,

## 2024-01-02 ENCOUNTER — DOCUMENTATION ONLY (OUTPATIENT)
Dept: SLEEP MEDICINE | Facility: CLINIC | Age: 44
End: 2024-01-02
Payer: COMMERCIAL

## 2024-01-02 DIAGNOSIS — G47.33 OSA (OBSTRUCTIVE SLEEP APNEA): Primary | ICD-10-CM

## 2024-01-02 NOTE — PROGRESS NOTES
Patient was offered choice of vendor and chose [facility].  Patient Howie Yang was set up at [place] on January 2, 2024. Patient received a Resmed Airsense 10 Pressures were set at  10-16 cm H2O.   Patient s ramp is 5 cm H2O for Off and FLEX/EPR is EPR.  Patient DID NOT receive a mask today as he is not eligible. Pt did recieve heated tubing and heated humidifier.  Patient has the following compliance requirements: none  Patient has a follow up on TBD with Deshawn Sánchez